# Patient Record
Sex: FEMALE | Race: WHITE | NOT HISPANIC OR LATINO | Employment: UNEMPLOYED | URBAN - METROPOLITAN AREA
[De-identification: names, ages, dates, MRNs, and addresses within clinical notes are randomized per-mention and may not be internally consistent; named-entity substitution may affect disease eponyms.]

---

## 2018-02-20 ENCOUNTER — TELEPHONE (OUTPATIENT)
Dept: FAMILY MEDICINE | Facility: CLINIC | Age: 58
End: 2018-02-20

## 2018-02-20 NOTE — TELEPHONE ENCOUNTER
She states that she gets elevated bp with pounding in head while just barely straining on toliet. And when at rest pt states her HR increases and feels like it is going to burst though her chest and HR is around 114.    pt advised to go to UC/ED. pt verbalized understanding. pt states that they will go to UC now.

## 2018-02-20 NOTE — TELEPHONE ENCOUNTER
----- Message from Stacie Kamran sent at 2/20/2018  3:03 PM CST -----  Please call pt at [phone] states she was a former pt of Dr Cota / next available appt is 03/01 .. Asking to be seen sooner / has elevated blood pressure with pounding in her head / seems worse when she has a bowel movement / please advise

## 2018-03-01 ENCOUNTER — OFFICE VISIT (OUTPATIENT)
Dept: FAMILY MEDICINE | Facility: CLINIC | Age: 58
End: 2018-03-01
Payer: COMMERCIAL

## 2018-03-01 VITALS
HEART RATE: 95 BPM | BODY MASS INDEX: 40.16 KG/M2 | TEMPERATURE: 99 F | DIASTOLIC BLOOD PRESSURE: 90 MMHG | WEIGHT: 204.56 LBS | HEIGHT: 60 IN | OXYGEN SATURATION: 95 % | SYSTOLIC BLOOD PRESSURE: 142 MMHG

## 2018-03-01 DIAGNOSIS — I34.1 MVP (MITRAL VALVE PROLAPSE): ICD-10-CM

## 2018-03-01 DIAGNOSIS — F41.1 GENERALIZED ANXIETY DISORDER: ICD-10-CM

## 2018-03-01 DIAGNOSIS — R74.01 TRANSAMINITIS: ICD-10-CM

## 2018-03-01 DIAGNOSIS — R55 PRE-SYNCOPE: ICD-10-CM

## 2018-03-01 DIAGNOSIS — R00.2 HEART PALPITATIONS: ICD-10-CM

## 2018-03-01 DIAGNOSIS — R94.31 ABNORMAL EKG: ICD-10-CM

## 2018-03-01 DIAGNOSIS — Z09 HOSPITAL DISCHARGE FOLLOW-UP: Primary | ICD-10-CM

## 2018-03-01 DIAGNOSIS — K21.9 GASTROESOPHAGEAL REFLUX DISEASE, ESOPHAGITIS PRESENCE NOT SPECIFIED: ICD-10-CM

## 2018-03-01 DIAGNOSIS — F41.0 PANIC ATTACKS: ICD-10-CM

## 2018-03-01 DIAGNOSIS — F43.9 SITUATIONAL STRESS: ICD-10-CM

## 2018-03-01 DIAGNOSIS — E03.9 HYPOTHYROIDISM, UNSPECIFIED TYPE: ICD-10-CM

## 2018-03-01 PROCEDURE — 99204 OFFICE O/P NEW MOD 45 MIN: CPT | Mod: S$GLB,,, | Performed by: INTERNAL MEDICINE

## 2018-03-01 PROCEDURE — 99999 PR PBB SHADOW E&M-EST. PATIENT-LVL IV: CPT | Mod: PBBFAC,,, | Performed by: INTERNAL MEDICINE

## 2018-03-01 RX ORDER — BUSPIRONE HYDROCHLORIDE 7.5 MG/1
TABLET ORAL
Qty: 60 TABLET | Refills: 1 | Status: SHIPPED | OUTPATIENT
Start: 2018-03-01 | End: 2018-03-20

## 2018-03-01 RX ORDER — ALPRAZOLAM 0.5 MG/1
0.5 TABLET ORAL 2 TIMES DAILY PRN
Qty: 30 TABLET | Refills: 0 | Status: SHIPPED | OUTPATIENT
Start: 2018-03-01 | End: 2018-03-20 | Stop reason: SDUPTHER

## 2018-03-01 RX ORDER — METHOCARBAMOL 750 MG/1
1500 TABLET, FILM COATED ORAL 3 TIMES DAILY
Refills: 0 | COMMUNITY
Start: 2018-02-12

## 2018-03-01 RX ORDER — OXYCODONE HYDROCHLORIDE 10 MG/1
10 TABLET ORAL 3 TIMES DAILY
COMMUNITY
Start: 2018-02-20

## 2018-03-01 RX ORDER — GABAPENTIN 300 MG/1
CAPSULE ORAL
Refills: 5 | COMMUNITY
Start: 2018-02-02

## 2018-03-01 RX ORDER — LEVOTHYROXINE SODIUM 150 MCG
TABLET ORAL
Refills: 3 | COMMUNITY
Start: 2018-02-07 | End: 2020-08-19

## 2018-03-01 RX ORDER — ERGOCALCIFEROL 1.25 MG/1
50000 CAPSULE ORAL
Refills: 3 | COMMUNITY
Start: 2018-01-31 | End: 2020-08-19

## 2018-03-01 NOTE — PROGRESS NOTES
Subjective:       Patient ID: Kimberly Jolley is a 57 y.o. female.    Chief Complaint: ER follow up [facility]; here to also get established w me again as her new PCP; previous pt of mine.    HPI  patient presents today in follow-up from [facility] your visit 2/21/18 when she was seen for palpitations.  History given in ER was that over 7 months she has occasional sporadic increase in blood pressure; day she came to the emergency room was 168/96 where she usually gets 120/80 at home.  Palpitations were noted as being of pounding rapid heartbeat or occasional skipped beats; in the emergency room her pulse rate was 114 then later noted as being 97.  Over the last 2-3 months  when she was trying to have a bowel movement she was noted to have some flushed sensations and lightheaded on occasions; for 3 episodes she told me she noted her heart racing for 30-60 seconds but no syncope; symptoms described or presented as being presyncope by the ER physician's hx; she reported vomiting with one episode but has never passed out.  She has a history of mitral valve prolapse with holter performed in the eighth grade.  She has no chest pain or shortness of breath with these events but does have occasional sweats and mild nausea.  Her palpitations with flutter sensation and thumping in the chest with a pause felt shortly thereafter and occurring about 2 times a day for last 3 months by my history but increasing to 5-6 times a day for the past month.      She was referred to see cardiologist Dr. Clark by the ER physician which she hasn't seen yet.  She does note anxiety which has been increased over last year with a history of chronic anxiety disorder;  She highly suspects that these may be panic attacks; she admits to some depression; and notes that she's been unemployed since 2014 and not sleeping okay.  In the ER patient was reportedly watched on telemetry; her caffeine intake was negative and takes only 2 glasses of wine  about twice a year.  ER records were reviewed at length.  She does admit to occasional sweats and slight nausea with the events. Labs were reviewed as well; chest x-ray was negative; EKG revealed a sinus tach at 117 with nonspecific ST/T-wave changes of note.  Interpretation by cardiologist returned possible anterior/ lateral infarct and upon review the EKG could also include inferior area as well.  The troponin I was negative ×2 in the ER.  Dr. Whitaker manages her chronic neck pain as her pain management physician of note.  Time 4:05 PM to 5:05 PM.  Greater than 50% of time spent in discussion, counseling, and review.  Past medical history and surgical history were reviewed and noted along with social medical history and family medical history.  Review of systems obtained at length prior to physical exam being performed.  [facility] ER records reviewed as well as the labs and workup; labs and US abd ordered for f/u. Cardiology consulted as well.    Review of Systems   Constitutional: Negative for appetite change, fever and unexpected weight change.   HENT: Positive for rhinorrhea. Negative for congestion, postnasal drip and sinus pressure.         Seasonal allergies   Eyes: Negative for discharge and itching.   Respiratory: Negative for cough, chest tightness, shortness of breath and wheezing.    Cardiovascular: Positive for palpitations. Negative for chest pain and leg swelling.   Gastrointestinal: Positive for constipation. Negative for abdominal distention, abdominal pain, blood in stool, diarrhea, nausea and vomiting.        Heartburn w belching   Endocrine: Negative for polydipsia, polyphagia and polyuria.   Genitourinary: Negative for dysuria and hematuria.   Musculoskeletal: Negative for arthralgias and myalgias.   Skin: Negative for rash.   Allergic/Immunologic: Positive for environmental allergies. Negative for food allergies.   Neurological: Positive for tremors. Negative for seizures and syncope.         Hand at times; Dad does same.   Hematological: Negative for adenopathy. Does not bruise/bleed easily.   Psychiatric/Behavioral:        Anxiety chronic and depression; problems sleeping at night.       Objective:      Vitals:    03/01/18 1505   BP: (!) 142/90   BP Location: Left arm   Patient Position: Sitting   BP Method: Medium (Manual)   Pulse: 95   Temp: 98.6 °F (37 °C)   TempSrc: Oral   SpO2: 95%   Weight: 92.8 kg (204 lb 9.4 oz)   Height: 5' (1.524 m)     Body mass index is 39.96 kg/m².    Physical Exam   Constitutional: She is oriented to person, place, and time. She appears well-developed and well-nourished.   HENT:   Head: Normocephalic and atraumatic.   Eyes: EOM are normal.   Neck: Normal range of motion. Neck supple. No thyromegaly present.   Cardiovascular: Normal rate, regular rhythm and normal heart sounds.  Exam reveals no gallop.    No murmur heard.  Pulmonary/Chest: Effort normal and breath sounds normal. No respiratory distress. She has no wheezes. She has no rales.   Abdominal: Soft. Bowel sounds are normal. She exhibits no distension. There is no tenderness. There is no rebound and no guarding.   Musculoskeletal: Normal range of motion. She exhibits no edema.   Lymphadenopathy:     She has no cervical adenopathy.   Neurological: She is alert and oriented to person, place, and time.   Moves all 4 extremities fine.   Skin: No rash noted.   Psychiatric: She has a normal mood and affect. Her behavior is normal. Thought content normal.   Vitals reviewed.      Assessment:       1. Hospital discharge follow-up    2. Heart palpitations    3. Pre-syncope    4. Generalized anxiety disorder    5. Situational stress    6. Panic attacks    7. MVP (mitral valve prolapse)    8. Transaminitis    9. Abnormal EKG    10. Gastroesophageal reflux disease, esophagitis presence not specified    11. Hypothyroidism, unspecified type        Plan:       Heart palpitations; limit caffeine. 24 hr holter needed. To see   [facility]    Pre-syncope; use Valsalva maneuver as needed for heart racing events.    Generalized anxiety disorder; can't tolerate lexapro, zoloft, or paxil; increase aggression noted; hasn't tried prozac. Can't tolerate wellbutrin.      Buspar 7.5 mg po BID after 1 week can increase to TID if needed. Xanax 0.5 mg po BID prn anxiety    Situational stress; stress reduction. Exercise when ok w cardiology.    Panic attacks; Xanax prn basis as above; no caffeine.    MVP (mitral valve prolapse); see Dr Clark cardiology for evaluation. Echo w doppler/holter needed..    Transaminitis; US abd w hepatitis panel needed, repeat LFT's.    Abnormal EKG; cardiology to address; r/o CAD; r/o MI. FMH of CAD.    Gastroesophageal reflux disease, esophagitis presence not specified. No bedtime snacks; weight reduction. Pepcid otc as needed for reflux.    Hypothyroid: needs TFT; same dosing til then.

## 2018-03-01 NOTE — PATIENT INSTRUCTIONS
Heart palpitations; limit caffeine. 24 hr holter needed.    Pre-syncope; use Valsalva maneuver as needed.    Generalized anxiety disorder; can't tolerate lexapro, zoloft, paxil; increase aggression noted; hasn't tried prozac. Can't tolerate wellbutrin.      Buspar 7.5 mg po BID after 1 week can increase to TID if needed. Xanax 0.5 mg po BID prn anxiety    Situational stress; stress reduction.     Panic attacks; Xanax prn basis as above    MVP (mitral valve prolapse); see Dr Clark cardiology for evaluation. Echo w doppler.    Transaminitis; US abd w hepatitis panel needed, repeat LFT's.    Abnormal EKG; cardiology to address; r/o CAD. FMH of CAD.    Gastroesophageal reflux disease, esophagitis presence not specified. No bedtime snacks; weight reduction. Pepcid otc as needed for reflux.    Hypothyroid: needs TFT; same dosing til then.

## 2018-03-06 ENCOUNTER — PATIENT OUTREACH (OUTPATIENT)
Dept: ADMINISTRATIVE | Facility: HOSPITAL | Age: 58
End: 2018-03-06

## 2018-03-06 NOTE — LETTER
March 6, 2018    Kimberly Jolley  [id number] [place] [id number]             [facility]  [address]  [place] [address]  Phone: [phone] Dear Mrs. Jolley:    [facility] is committed to your overall health.  To help you get the most out of each of your visits, we will review your information to make sure you are up to date on all of your recommended tests and/or procedures.      Dr. Benji Seo has found that your chart shows you may be due for your fasting cholesterol labs, hepatitis C screening, mammogram, colon cancer screening, and possibly a flu immunization.     If you have had any of the above done at another facility, please bring the records or information with you so that your record at [facility] will be complete.  If you would like to schedule any of these, please contact me.    If you are currently taking medication, please bring it with you to your appointment for review.    If you have any questions or concerns, please don't hesitate to call.    Thank you for letting us care for you,  Jasmina Lamar LPN Clinical Care Coordinator  [facility]  [phone]

## 2018-03-09 ENCOUNTER — HOSPITAL ENCOUNTER (OUTPATIENT)
Dept: RADIOLOGY | Facility: HOSPITAL | Age: 58
Discharge: HOME | End: 2018-03-09
Attending: INTERNAL MEDICINE
Payer: COMMERCIAL

## 2018-03-09 DIAGNOSIS — R74.01 TRANSAMINITIS: ICD-10-CM

## 2018-03-09 DIAGNOSIS — Z12.11 COLON CANCER SCREENING: ICD-10-CM

## 2018-03-09 PROCEDURE — 76700 US EXAM ABDOM COMPLETE: CPT | Mod: TC,PO

## 2018-03-09 PROCEDURE — 76700 US EXAM ABDOM COMPLETE: CPT | Mod: 26,,, | Performed by: RADIOLOGY

## 2018-03-14 ENCOUNTER — CLINICAL SUPPORT (OUTPATIENT)
Dept: CARDIOLOGY | Facility: CLINIC | Age: 58
End: 2018-03-14
Attending: INTERNAL MEDICINE
Payer: COMMERCIAL

## 2018-03-14 DIAGNOSIS — R55 PRE-SYNCOPE: ICD-10-CM

## 2018-03-14 DIAGNOSIS — R00.2 HEART PALPITATIONS: ICD-10-CM

## 2018-03-14 DIAGNOSIS — I34.1 MVP (MITRAL VALVE PROLAPSE): ICD-10-CM

## 2018-03-14 PROCEDURE — 93306 TTE W/DOPPLER COMPLETE: CPT | Mod: S$GLB,,, | Performed by: INTERNAL MEDICINE

## 2018-03-15 LAB
ESTIMATED PA SYSTOLIC PRESSURE: 17.44
MITRAL VALVE REGURGITATION: NORMAL
RETIRED EF AND QEF - SEE NOTES: 65 (ref 55–65)

## 2018-03-20 ENCOUNTER — OFFICE VISIT (OUTPATIENT)
Dept: FAMILY MEDICINE | Facility: CLINIC | Age: 58
End: 2018-03-20
Payer: COMMERCIAL

## 2018-03-20 VITALS
BODY MASS INDEX: 39.84 KG/M2 | WEIGHT: 202.94 LBS | HEART RATE: 93 BPM | TEMPERATURE: 98 F | OXYGEN SATURATION: 96 % | DIASTOLIC BLOOD PRESSURE: 84 MMHG | SYSTOLIC BLOOD PRESSURE: 136 MMHG | HEIGHT: 60 IN

## 2018-03-20 DIAGNOSIS — E89.0 POSTOPERATIVE HYPOTHYROIDISM: ICD-10-CM

## 2018-03-20 DIAGNOSIS — K21.9 GASTROESOPHAGEAL REFLUX DISEASE, ESOPHAGITIS PRESENCE NOT SPECIFIED: ICD-10-CM

## 2018-03-20 DIAGNOSIS — R13.10 DYSPHAGIA, UNSPECIFIED TYPE: ICD-10-CM

## 2018-03-20 DIAGNOSIS — R94.31 ABNORMAL EKG: ICD-10-CM

## 2018-03-20 DIAGNOSIS — F43.9 SITUATIONAL STRESS: ICD-10-CM

## 2018-03-20 DIAGNOSIS — R74.01 TRANSAMINITIS: ICD-10-CM

## 2018-03-20 DIAGNOSIS — R00.2 HEART PALPITATIONS: ICD-10-CM

## 2018-03-20 DIAGNOSIS — E66.01 CLASS 2 SEVERE OBESITY DUE TO EXCESS CALORIES WITH SERIOUS COMORBIDITY AND BODY MASS INDEX (BMI) OF 39.0 TO 39.9 IN ADULT: ICD-10-CM

## 2018-03-20 DIAGNOSIS — R73.01 IMPAIRED FASTING BLOOD SUGAR: ICD-10-CM

## 2018-03-20 DIAGNOSIS — F41.1 GENERALIZED ANXIETY DISORDER: Primary | ICD-10-CM

## 2018-03-20 DIAGNOSIS — I34.1 MVP (MITRAL VALVE PROLAPSE): ICD-10-CM

## 2018-03-20 DIAGNOSIS — F41.0 PANIC ATTACKS: ICD-10-CM

## 2018-03-20 DIAGNOSIS — K76.0 FATTY LIVER: ICD-10-CM

## 2018-03-20 PROBLEM — R55 PRE-SYNCOPE: Status: ACTIVE | Noted: 2018-03-20

## 2018-03-20 PROBLEM — E03.9 HYPOTHYROIDISM: Status: ACTIVE | Noted: 2018-03-20

## 2018-03-20 PROCEDURE — 99999 PR PBB SHADOW E&M-EST. PATIENT-LVL IV: CPT | Mod: PBBFAC,,, | Performed by: INTERNAL MEDICINE

## 2018-03-20 PROCEDURE — 99215 OFFICE O/P EST HI 40 MIN: CPT | Mod: S$GLB,,, | Performed by: INTERNAL MEDICINE

## 2018-03-20 RX ORDER — BUSPIRONE HYDROCHLORIDE 10 MG/1
TABLET ORAL
Qty: 60 TABLET | Refills: 1 | Status: SHIPPED | OUTPATIENT
Start: 2018-03-20 | End: 2018-06-01 | Stop reason: SDUPTHER

## 2018-03-20 RX ORDER — PANTOPRAZOLE SODIUM 40 MG/1
40 TABLET, DELAYED RELEASE ORAL DAILY
Qty: 30 TABLET | Refills: 2 | Status: SHIPPED | OUTPATIENT
Start: 2018-03-20 | End: 2022-02-15

## 2018-03-20 RX ORDER — ALPRAZOLAM 0.5 MG/1
0.5 TABLET ORAL 2 TIMES DAILY PRN
Qty: 30 TABLET | Refills: 0 | Status: SHIPPED | OUTPATIENT
Start: 2018-03-20 | End: 2018-05-04 | Stop reason: SDUPTHER

## 2018-03-20 RX ORDER — METOPROLOL SUCCINATE 25 MG/1
25 TABLET, EXTENDED RELEASE ORAL DAILY
Qty: 30 TABLET | Refills: 2 | Status: SHIPPED | OUTPATIENT
Start: 2018-03-20 | End: 2018-06-11 | Stop reason: SDUPTHER

## 2018-03-20 NOTE — PROGRESS NOTES
Subjective:       Patient ID: Kimberly Jolley is a 57 y.o. female.    Chief Complaint: Follow-up (US)    HPI Pt here for f/u eval from last office visit; US abd reveals L renal cyst, fatty liver w hepatomegaly; will repeat US in 6 mos. Patient's heart palpitations have improved since she's been on no caffeine.  She has an appointment pending with cardiology 3/28/18 Dr. Clark; could still benefit from a 24-hour Holter.  Pt w generalized anxiety disorder with increased anxiety; hasn't really improved that much.  She is using BuSpar at 7.5 mg 3 times a day with no real benefit so far; she can't tolerate SSRI agents or dopa/norepi reuptake inhibitors; unable to tolerate Zoloft, Paxil, Lexapro, Celexa, Wellbutrin, or Effexor.  Xanax reportedly helps her palpitations.  She has no chest pain with her palpitations except the flutter sensation in her throat.  She does have a history of mitral valve prolapse and panic attacks.  She does reportedly get short fuse easily and worries a lot reportedly; she has been unable to find a job since 2014; marriage reportedly has been stable; her kids are grown; but son has a recent divorce and he is also an alcoholic living in [place] reportedly.  Patient's appetite has been okay but she is also reportedly depressed; not doing things with crying spells at times; with problems getting/staying asleep at times as well.  She does have hypothyroidism and is followed by  an endocrinologist in [place].  2/21/18 TSH was 2.45 and Synthroid was reportedly increased from 137-to 150 µg per day 4 months ago of note.  She has a history of thyroid nodules and has reportedly had a  hemithyroidectomy;  labs were reviewed with patient at length questions were answered.     Total time 948 to 10:45 AM.  Greater than 50% of the time spent on discussion, counseling, and review.  Ultrasound of the abdomen and labs were discussed with patient at length consultations were placed to psychiatry as well  as gastroenterology.  Labs also ordered for follow-up.  Has medical history and surgical medical history were reviewed and noted social medical history and family medical history also were reviewed and noted review of systems obtained at length prior to physical exam been performed.    Review of Systems   Constitutional: Positive for activity change and fatigue. Negative for unexpected weight change.   HENT: Positive for hearing loss and trouble swallowing. Negative for rhinorrhea.         Chronic since 1993. Chronic tinnitus; has seen ENT's prior. Belching and heartburn. No bedtime snacks. Trouble swallowing solids and liquids intermitently; has had esophagus dilated 2-3x's. Last was early 2000; saw Dr Almonte.    Eyes: Positive for visual disturbance. Negative for discharge.   Respiratory: Negative for chest tightness and wheezing.    Cardiovascular: Positive for palpitations. Negative for chest pain.   Gastrointestinal: Positive for constipation. Negative for blood in stool, diarrhea and vomiting.        GERD not on anything; dulcolax laxative as needed.   Endocrine: Negative for polydipsia and polyuria.   Genitourinary: Negative for difficulty urinating, dysuria, hematuria and menstrual problem.   Musculoskeletal: Positive for arthralgias and neck pain. Negative for joint swelling.        Chronic neck pain; has seen dr Acosta anaesthesia for chr pain tx. ; and Dr Aquino as well as her neurologist   Skin: Negative for rash.   Allergic/Immunologic: Negative for environmental allergies and food allergies.   Neurological: Negative for tremors, weakness and headaches.   Hematological: Negative for adenopathy. Does not bruise/bleed easily.   Psychiatric/Behavioral: Positive for dysphoric mood. Negative for confusion.        Anxiety and depression.        Objective:      Vitals:    03/20/18 0932   BP: 136/84   BP Location: Left arm   Patient Position: Sitting   BP Method: Medium (Manual)   Pulse: 93   Temp: 98.1 °F (36.7  °C)   TempSrc: Oral   SpO2: 96%   Weight: 92 kg (202 lb 14.9 oz)   Height: 5' (1.524 m)     Body mass index is 39.63 kg/m².    Physical Exam    Assessment:       1. Generalized anxiety disorder    2. Panic attacks    3. Situational stress    4. Heart palpitations    5. MVP (mitral valve prolapse)    6. Abnormal EKG    7. Postoperative hypothyroidism    8. Transaminitis    9. Fatty liver    10. Class 2 severe obesity due to excess calories with serious comorbidity and body mass index (BMI) of 39.0 to 39.9 in adult    11. Gastroesophageal reflux disease, esophagitis presence not specified    12. Dysphagia, unspecified type    13. History of esophageal dilatation        Plan:       Generalized anxiety disorder; increase buspar to 10 mg po TID prn; psychiatry consult to Dr AMADO Silva.  She also has Xanax 0.5 mg twice a day as needed for anxiety and panic attacks.    Panic attacks; xanxax 0.5 mg BID prn panic attacks renewed.     Situational stress; has buspar to titrate. Exercise w stress reduction    Heart palpitations; has card apt pending Dr Clark, 3/28/18 for w/u. Add metoprolol 25 mg a day; no caffeine.    MVP (mitral valve prolapse); as above; echo needed as well.    Abnormal EKG; needs to r/o CAD; EST as per cardiology; [facility] of CAD, her Dad and his father who  of MI at 55-57.     ASA 81 mg a day.    Postoperative hypothyroidism; endocrine tx's her hypothyroidism; on levothyroxin supplements 150 µg daily.    Transaminitis; limit tylenol and NSAID's. No alcohol to continue; exercise w weight reduction.  Serial following of her liver function tests    Fatty liver; as above; US in 6 mos for reassessment.  Hepatomegaly was also noted; along with a left renal cyst, which will also need an ultrasound in 6 months to document stability of both    Class 2 severe obesity due to excess calories with serious comorbidity and body mass index (BMI) of 39.0 to 39.9 in adult;      Caloric restriction w regular exercise and  weight reduction.  The swallow also help her fatty liver and hepatomegaly.    Gastroesophageal reflux disease, esophagitis presence not specified; see Dr Fabian DAVIS for EGD; add protonix 40 mg daily.        No bedtime snacks. Cut solids well. Extra sips of fluid w eating. Weight reductioon will also help.    Dysphagia, unspecified type; w/u as per GI.  History of esophageal dilatation; 2-3x in past.     Impaired fasting blood sugar; needing hemoglobin A1c and a 2 hour glucose tolerance test; family history positive for diabetes is well

## 2018-03-20 NOTE — PATIENT INSTRUCTIONS
Generalized anxiety disorder; increase buspar to 10 mg po TID prn; psychiatry consult to Dr AMADO Silva.    Panic attacks; xanxax 0.5 mg BID prn panic attacks renewed.    Situational stress; has buspar to titrate. Exercise w stress reduction    Heart palpitations; has card apt pending Dr Clark, 3/28/18 for w/u.    MVP (mitral valve prolapse); as above; echo needed as well.    Abnormal EKG; needs to r/o CAD; EST as per cardiology; [facility] of CAD, her Dad and his father who  of MI at 55-57.    Postoperative hypothyroidism; endocrine tx's her hypothyroidism    Transaminitis; limit tylenol and NSAID's. No alcohol to continue; exercise w weight reduction.    Fatty liver; as above; US in 6 mos for reassessment.     Class 2 severe obesity due to excess calories with serious comorbidity and body mass index (BMI) of 39.0 to 39.9 in adult;      Caloric restriction w regular exercise and weight reduction.    Gastroesophageal reflux disease, esophagitis presence not specified; see Dr Peralta GI for EGD; add protonix 40 mg daily.        No bedtime snacks. Cut solids well. Extra sips of fluid w eating. Weight reductioon will also help.    Dysphagia, unspecified type; w/u as per GI.    History of esophageal dilatation; 2-3x in past.

## 2018-03-28 ENCOUNTER — OFFICE VISIT (OUTPATIENT)
Dept: CARDIOLOGY | Facility: CLINIC | Age: 58
End: 2018-03-28
Payer: COMMERCIAL

## 2018-03-28 VITALS
DIASTOLIC BLOOD PRESSURE: 94 MMHG | BODY MASS INDEX: 40.6 KG/M2 | HEIGHT: 60 IN | SYSTOLIC BLOOD PRESSURE: 157 MMHG | WEIGHT: 206.81 LBS | HEART RATE: 81 BPM

## 2018-03-28 DIAGNOSIS — R00.2 HEART PALPITATIONS: Primary | ICD-10-CM

## 2018-03-28 DIAGNOSIS — R06.02 SHORTNESS OF BREATH: ICD-10-CM

## 2018-03-28 DIAGNOSIS — F41.1 GENERALIZED ANXIETY DISORDER: ICD-10-CM

## 2018-03-28 DIAGNOSIS — I34.1 MVP (MITRAL VALVE PROLAPSE): ICD-10-CM

## 2018-03-28 PROCEDURE — 99214 OFFICE O/P EST MOD 30 MIN: CPT | Mod: S$GLB,,, | Performed by: INTERNAL MEDICINE

## 2018-03-28 PROCEDURE — 99999 PR PBB SHADOW E&M-EST. PATIENT-LVL III: CPT | Mod: PBBFAC,,, | Performed by: INTERNAL MEDICINE

## 2018-03-28 NOTE — PROGRESS NOTES
Subjective:    Patient ID:  Kimberly Jolley is a 57 y.o. female who presents for evaluation of palpitations    HPI  She comes with palpitations on/off for the last months, lasting seconds, no syncope      Review of Systems   Constitution: Negative for decreased appetite, weakness, malaise/fatigue, weight gain and weight loss.   Cardiovascular: Positive for chest pain and irregular heartbeat. Negative for dyspnea on exertion, leg swelling, palpitations and syncope.   Respiratory: Negative for cough and shortness of breath.    Gastrointestinal: Negative.    All other systems reviewed and are negative.       Objective:    Physical Exam   Constitutional: She is oriented to person, place, and time. She appears well-developed and well-nourished.   HENT:   Head: Normocephalic.   Eyes: Pupils are equal, round, and reactive to light.   Neck: Normal range of motion. Neck supple. No JVD present. Carotid bruit is not present. No thyromegaly present.   Cardiovascular: Normal rate, regular rhythm, normal heart sounds, intact distal pulses and normal pulses.  PMI is not displaced.  Exam reveals no gallop.    No murmur heard.  Pulmonary/Chest: Effort normal and breath sounds normal.   Abdominal: Soft. Normal appearance. She exhibits no mass. There is no hepatosplenomegaly. There is no tenderness.   Musculoskeletal: Normal range of motion. She exhibits no edema.   Neurological: She is alert and oriented to person, place, and time. She has normal strength and normal reflexes. No sensory deficit.   Skin: Skin is warm and intact.   Psychiatric: She has a normal mood and affect.   Nursing note and vitals reviewed.    Echo reviewed      Assessment:       1. Heart palpitations    2. MVP (mitral valve prolapse)         Plan:     [facility]  Call with results

## 2018-04-02 ENCOUNTER — TELEPHONE (OUTPATIENT)
Dept: FAMILY MEDICINE | Facility: CLINIC | Age: 58
End: 2018-04-02

## 2018-04-02 PROBLEM — K76.0 FATTY LIVER: Status: ACTIVE | Noted: 2018-04-02

## 2018-04-02 PROBLEM — E66.01 CLASS 2 SEVERE OBESITY DUE TO EXCESS CALORIES WITH SERIOUS COMORBIDITY AND BODY MASS INDEX (BMI) OF 39.0 TO 39.9 IN ADULT: Status: ACTIVE | Noted: 2018-04-02

## 2018-04-02 PROBLEM — R73.01 IMPAIRED FASTING BLOOD SUGAR: Status: ACTIVE | Noted: 2018-04-02

## 2018-04-02 PROBLEM — R13.10 DYSPHAGIA: Status: ACTIVE | Noted: 2018-04-02

## 2018-04-02 PROBLEM — Z98.890 HISTORY OF ESOPHAGEAL DILATATION: Status: ACTIVE | Noted: 2018-04-02

## 2018-04-02 NOTE — TELEPHONE ENCOUNTER
Please notify patient that we've added a hemoglobin A1c and a 2 hour glucose tolerance test to her labs that are needed.  She will need to come by the office to  prescription for these 2 lab items.  She'll need to overnight fast for 8 hours and be prepared to spend 2-1/2 hours in the lab for the 2 hour glucose test.

## 2018-04-02 NOTE — TELEPHONE ENCOUNTER
Spoke with pt and informed her that she can come to the office to  additional lab orders.     Pt said that she would like to call her insurance first due to if her insurance will cover she would like to get labs done at [facility].     Pt said that she will call back to let us know.

## 2018-04-12 ENCOUNTER — TELEPHONE (OUTPATIENT)
Dept: FAMILY MEDICINE | Facility: CLINIC | Age: 58
End: 2018-04-12

## 2018-04-13 NOTE — TELEPHONE ENCOUNTER
Spoke with pt and informed her that labs have been changed to [facility]. Pt schedule lab apt for 04/16/2018 with follow up on 04/24/2018

## 2018-04-13 NOTE — TELEPHONE ENCOUNTER
Please notify patient that her labs were changed to [facility] and they are to be obtained after an overnight 8 hour fast

## 2018-04-18 ENCOUNTER — LAB VISIT (OUTPATIENT)
Dept: LAB | Facility: HOSPITAL | Age: 58
End: 2018-04-18
Attending: INTERNAL MEDICINE
Payer: COMMERCIAL

## 2018-04-18 DIAGNOSIS — F41.0 PANIC ATTACKS: ICD-10-CM

## 2018-04-18 DIAGNOSIS — R73.01 IMPAIRED FASTING BLOOD SUGAR: ICD-10-CM

## 2018-04-18 DIAGNOSIS — I34.1 MVP (MITRAL VALVE PROLAPSE): ICD-10-CM

## 2018-04-18 DIAGNOSIS — K76.0 FATTY LIVER: ICD-10-CM

## 2018-04-18 DIAGNOSIS — F41.1 GENERALIZED ANXIETY DISORDER: ICD-10-CM

## 2018-04-18 DIAGNOSIS — R74.01 TRANSAMINITIS: ICD-10-CM

## 2018-04-18 DIAGNOSIS — E66.01 CLASS 2 SEVERE OBESITY DUE TO EXCESS CALORIES WITH SERIOUS COMORBIDITY AND BODY MASS INDEX (BMI) OF 39.0 TO 39.9 IN ADULT: ICD-10-CM

## 2018-04-18 DIAGNOSIS — R00.2 HEART PALPITATIONS: ICD-10-CM

## 2018-04-18 LAB
ALBUMIN SERPL BCP-MCNC: 3.9 G/DL
ALP SERPL-CCNC: 75 U/L
ALT SERPL W/O P-5'-P-CCNC: 76 U/L
ANION GAP SERPL CALC-SCNC: 11 MMOL/L
AST SERPL-CCNC: 49 U/L
BILIRUB SERPL-MCNC: 0.8 MG/DL
BUN SERPL-MCNC: 21 MG/DL
CALCIUM SERPL-MCNC: 10 MG/DL
CHLORIDE SERPL-SCNC: 106 MMOL/L
CO2 SERPL-SCNC: 27 MMOL/L
CREAT SERPL-MCNC: 0.8 MG/DL
EST. GFR  (AFRICAN AMERICAN): >60 ML/MIN/1.73 M^2
EST. GFR  (NON AFRICAN AMERICAN): >60 ML/MIN/1.73 M^2
ESTIMATED AVG GLUCOSE: 114 MG/DL
GLUCOSE SERPL-MCNC: 105 MG/DL
GLUCOSE SERPL-MCNC: 107 MG/DL
GLUCOSE SERPL-MCNC: 111 MG/DL
GLUCOSE SERPL-MCNC: 177 MG/DL
HBA1C MFR BLD HPLC: 5.6 %
HBV CORE IGM SERPL QL IA: NEGATIVE
HBV SURFACE AG SERPL QL IA: NEGATIVE
HCV AB SERPL QL IA: NEGATIVE
POTASSIUM SERPL-SCNC: 4.1 MMOL/L
PROT SERPL-MCNC: 7.4 G/DL
SODIUM SERPL-SCNC: 144 MMOL/L
T4 FREE SERPL-MCNC: 1.12 NG/DL
TSH SERPL DL<=0.005 MIU/L-ACNC: 3.28 UIU/ML

## 2018-04-18 PROCEDURE — 84439 ASSAY OF FREE THYROXINE: CPT

## 2018-04-18 PROCEDURE — 86803 HEPATITIS C AB TEST: CPT

## 2018-04-18 PROCEDURE — 80053 COMPREHEN METABOLIC PANEL: CPT

## 2018-04-18 PROCEDURE — 83036 HEMOGLOBIN GLYCOSYLATED A1C: CPT

## 2018-04-18 PROCEDURE — 84443 ASSAY THYROID STIM HORMONE: CPT

## 2018-04-18 PROCEDURE — 36415 COLL VENOUS BLD VENIPUNCTURE: CPT | Mod: PO

## 2018-04-18 PROCEDURE — 86705 HEP B CORE ANTIBODY IGM: CPT

## 2018-04-18 PROCEDURE — 82951 GLUCOSE TOLERANCE TEST (GTT): CPT

## 2018-04-18 PROCEDURE — 87340 HEPATITIS B SURFACE AG IA: CPT

## 2018-04-24 ENCOUNTER — OFFICE VISIT (OUTPATIENT)
Dept: FAMILY MEDICINE | Facility: CLINIC | Age: 58
End: 2018-04-24
Payer: COMMERCIAL

## 2018-04-24 VITALS
SYSTOLIC BLOOD PRESSURE: 138 MMHG | DIASTOLIC BLOOD PRESSURE: 86 MMHG | BODY MASS INDEX: 41.23 KG/M2 | OXYGEN SATURATION: 98 % | HEART RATE: 100 BPM | HEIGHT: 60 IN | WEIGHT: 210 LBS | TEMPERATURE: 98 F

## 2018-04-24 DIAGNOSIS — R73.01 IMPAIRED FASTING GLUCOSE: ICD-10-CM

## 2018-04-24 DIAGNOSIS — R07.9 CHEST PAIN, UNSPECIFIED TYPE: ICD-10-CM

## 2018-04-24 DIAGNOSIS — R73.01 IMPAIRED FASTING BLOOD SUGAR: ICD-10-CM

## 2018-04-24 DIAGNOSIS — F41.0 GENERALIZED ANXIETY DISORDER WITH PANIC ATTACKS: ICD-10-CM

## 2018-04-24 DIAGNOSIS — K76.0 FATTY LIVER: ICD-10-CM

## 2018-04-24 DIAGNOSIS — K21.9 GASTROESOPHAGEAL REFLUX DISEASE, ESOPHAGITIS PRESENCE NOT SPECIFIED: ICD-10-CM

## 2018-04-24 DIAGNOSIS — F41.1 GENERALIZED ANXIETY DISORDER WITH PANIC ATTACKS: ICD-10-CM

## 2018-04-24 DIAGNOSIS — Z00.00 HEALTHCARE MAINTENANCE: ICD-10-CM

## 2018-04-24 DIAGNOSIS — R74.01 TRANSAMINITIS: ICD-10-CM

## 2018-04-24 DIAGNOSIS — E03.9 HYPOTHYROIDISM, UNSPECIFIED TYPE: Primary | ICD-10-CM

## 2018-04-24 PROCEDURE — 99999 PR PBB SHADOW E&M-EST. PATIENT-LVL III: CPT | Mod: PBBFAC,,, | Performed by: INTERNAL MEDICINE

## 2018-04-24 PROCEDURE — 99214 OFFICE O/P EST MOD 30 MIN: CPT | Mod: S$GLB,,, | Performed by: INTERNAL MEDICINE

## 2018-04-24 RX ORDER — IBUPROFEN 200 MG
200 TABLET ORAL SEE ADMIN INSTRUCTIONS
COMMUNITY

## 2018-04-24 RX ORDER — LEVOTHYROXINE SODIUM 25 UG/1
TABLET ORAL
Qty: 15 TABLET | Refills: 2 | Status: SHIPPED | OUTPATIENT
Start: 2018-04-24 | End: 2018-05-14 | Stop reason: SDUPTHER

## 2018-04-24 NOTE — PATIENT INSTRUCTIONS
Hypothyroidism, unspecified type; increase synthroid by 12.5 mcg a day.  -     levothyroxine (SYNTHROID) 25 MCG tablet; 1/2 of 25 mcg po q am. Please give as SYNTHROID and not generic.  Dispense: 15 tablet; Refill: 2    Impaired fasting blood sugar. Exercise recommended with weight reduction and low carb diet; we'll follow hemoglobin A1c's with you periodically.    Transaminitis; keep well hydrated; no alcohol or tylenol. Milk thistle supplements. Exercise w weight reduction.     Fatty liver w HMG.    Gastroesophageal reflux disease, esophagitis presence not specified. No bedtime snacks; weight reduction. Pantoprazole.    Class 3 obesity with serious comorbidity and body mass index (BMI) of 40.0 to 44.9 in adult, unspecified obesity type. Caloric     restriction w regular exercise and weight reduction.    Generalized anxiety disorder with panic attacks; exercise w stress reduction; limit her caffeine. Stress redcution; exercise; Has buspar or xanxax if needed.    Chest pain, unspecified type; Dr Clark working up; EST tomorrow;

## 2018-04-24 NOTE — PROGRESS NOTES
Subjective:       Patient ID: Kimberly Jolley is a 57 y.o. female.    Chief Complaint: Follow-up (labs)    HPI  Overall she's been doing fine.   Anxiety w hx of panic ds.: can't tolerate a lot of meds for anxiety; Xanax or buspar not much help.  Transaminitis/fatty liver; no alcohol or tylenol. Tries to stay well hydrated. Exercises needed; will help.   GERD; no bedtime snacks; pantoprazole daily.  Hypothyroidism: takes her thyroid medicine appropriately.   Obesity; exercise regimen needs to be started and adhered to Goal 5x a week for 30 min each day. Smaller portions.  Total time: 1628-1533. >50% time spent on counseling, discussion, and review.    Review of Systems   Constitutional: Negative for activity change and unexpected weight change.   HENT: Positive for rhinorrhea. Negative for hearing loss and trouble swallowing.         Seasonal allergies   Eyes: Negative for discharge and visual disturbance.   Respiratory: Positive for chest tightness. Negative for wheezing.         Occ chest pain w EST tomorrow by Dr Clark   Cardiovascular: Positive for palpitations. Negative for chest pain.        Cardiology to address. Limit her caffeine.   Gastrointestinal: Negative for blood in stool, constipation, diarrhea and vomiting.   Endocrine: Negative for polydipsia and polyuria.   Genitourinary: Negative for difficulty urinating, dysuria, hematuria and menstrual problem.   Musculoskeletal: Positive for neck pain. Negative for arthralgias and joint swelling.        Dr Acosta managing.   Skin: Negative for rash.   Allergic/Immunologic: Positive for environmental allergies. Negative for food allergies.   Neurological: Negative for syncope, weakness and headaches.   Hematological: Negative for adenopathy. Does not bruise/bleed easily.   Psychiatric/Behavioral: Positive for dysphoric mood. Negative for confusion.       Objective:      Vitals:    04/24/18 1308   BP: 138/86   BP Location: Left arm   Patient Position: Sitting    BP Method: Medium (Manual)   Pulse: 100   Temp: 98.2 °F (36.8 °C)   TempSrc: Oral   SpO2: 98%   Weight: 95.2 kg (209 lb 15.8 oz)   Height: 5' (1.524 m)     Body mass index is 41.01 kg/m².    Physical Exam   Constitutional: She is oriented to person, place, and time. She appears well-developed and well-nourished.   HENT:   Head: Normocephalic and atraumatic.   Eyes: EOM are normal.   Neck: Normal range of motion. Neck supple. No thyromegaly present.   Supple w base of cervical vert tender to palp; seeing Dr Acosta for this.   Cardiovascular: Normal rate, regular rhythm and normal heart sounds.  Exam reveals no gallop.    No murmur heard.  Pulmonary/Chest: Effort normal and breath sounds normal. No respiratory distress. She has no wheezes. She has no rales.   Abdominal: Soft. Bowel sounds are normal. She exhibits no distension. There is no tenderness. There is no rebound and no guarding.   Musculoskeletal: Normal range of motion. She exhibits no edema.   Lymphadenopathy:     She has no cervical adenopathy.   Neurological: She is alert and oriented to person, place, and time.   Moves all 4 extremities fine.   Skin: No rash noted.   Psychiatric: She has a normal mood and affect. Her behavior is normal. Thought content normal.   Vitals reviewed.      Assessment:       1. Hypothyroidism, unspecified type    2. Impaired fasting blood sugar    3. Transaminitis    4. Fatty liver    5. Gastroesophageal reflux disease, esophagitis presence not specified    6. Class 3 obesity with serious comorbidity and body mass index (BMI) of 40.0 to 44.9 in adult, unspecified obesity type    7. Generalized anxiety disorder with panic attacks    8. Chest pain, unspecified type    9. Healthcare maintenance    10. Impaired fasting glucose        Plan:       Hypothyroidism, unspecified type; increase synthroid by 12.5 mcg a day.  -     levothyroxine (SYNTHROID) 25 MCG tablet; 1/2 of 25 mcg po q am. Please give as SYNTHROID and not generic.   Dispense: 15 tablet; Refill: 2    Impaired fasting blood sugar. Exercise recommended with weight reduction and low carb diet; we'll follow hemoglobin A1c's with you periodically.    Transaminitis; keep well hydrated; no alcohol or tylenol. Milk thistle supplements. Exercise w weight reduction.     Fatty liver w HMG.    Gastroesophageal reflux disease, esophagitis presence not specified. No bedtime snacks; weight reduction. Pantoprazole.    Class 3 obesity with serious comorbidity and body mass index (BMI) of 40.0 to 44.9 in adult, unspecified obesity type. Caloric     restriction w regular exercise and weight reduction.    Generalized anxiety disorder with panic attacks; exercise w stress reduction; limit her caffeine. Stress redcution; exercise; Has buspar or xanxax if needed.    Chest pain, unspecified type; Dr Clark working up; EST tomorrow;     Healthcare maintenance; mammogram ordered. DEXA was last yr. w osteopenia improving. 2yr f/u.  Weight bearing exercises. Calcium w vit D needed.

## 2018-04-25 ENCOUNTER — HOSPITAL ENCOUNTER (OUTPATIENT)
Dept: RADIOLOGY | Facility: HOSPITAL | Age: 58
Discharge: HOME | End: 2018-04-25
Attending: INTERNAL MEDICINE
Payer: COMMERCIAL

## 2018-04-25 ENCOUNTER — CLINICAL SUPPORT (OUTPATIENT)
Dept: CARDIOLOGY | Facility: CLINIC | Age: 58
End: 2018-04-25
Attending: INTERNAL MEDICINE
Payer: COMMERCIAL

## 2018-04-25 DIAGNOSIS — F41.1 GENERALIZED ANXIETY DISORDER: ICD-10-CM

## 2018-04-25 DIAGNOSIS — R55 PRE-SYNCOPE: ICD-10-CM

## 2018-04-25 DIAGNOSIS — R06.02 SHORTNESS OF BREATH: ICD-10-CM

## 2018-04-25 DIAGNOSIS — R00.2 HEART PALPITATIONS: ICD-10-CM

## 2018-04-25 DIAGNOSIS — I34.1 MVP (MITRAL VALVE PROLAPSE): ICD-10-CM

## 2018-04-25 PROCEDURE — 93224 XTRNL ECG REC UP TO 48 HRS: CPT | Mod: S$GLB,,, | Performed by: INTERNAL MEDICINE

## 2018-04-25 PROCEDURE — 93015 CV STRESS TEST SUPVJ I&R: CPT | Mod: S$GLB,,, | Performed by: INTERNAL MEDICINE

## 2018-04-25 PROCEDURE — 78452 HT MUSCLE IMAGE SPECT MULT: CPT | Mod: 26,,, | Performed by: INTERNAL MEDICINE

## 2018-04-26 LAB — DIASTOLIC DYSFUNCTION: NO

## 2018-04-30 ENCOUNTER — HOSPITAL ENCOUNTER (OUTPATIENT)
Dept: RADIOLOGY | Facility: HOSPITAL | Age: 58
Discharge: HOME | End: 2018-04-30
Attending: INTERNAL MEDICINE
Payer: COMMERCIAL

## 2018-04-30 DIAGNOSIS — Z12.31 VISIT FOR SCREENING MAMMOGRAM: ICD-10-CM

## 2018-04-30 DIAGNOSIS — Z00.00 HEALTHCARE MAINTENANCE: ICD-10-CM

## 2018-04-30 PROCEDURE — 77067 SCR MAMMO BI INCL CAD: CPT | Mod: TC,PO

## 2018-04-30 PROCEDURE — 77067 SCR MAMMO BI INCL CAD: CPT | Mod: 26,,, | Performed by: RADIOLOGY

## 2018-04-30 PROCEDURE — 77063 BREAST TOMOSYNTHESIS BI: CPT | Mod: 26,,, | Performed by: RADIOLOGY

## 2018-05-01 ENCOUNTER — TELEPHONE (OUTPATIENT)
Dept: FAMILY MEDICINE | Facility: CLINIC | Age: 58
End: 2018-05-01

## 2018-05-04 DIAGNOSIS — F41.1 GENERALIZED ANXIETY DISORDER: ICD-10-CM

## 2018-05-04 DIAGNOSIS — F41.0 PANIC ATTACKS: ICD-10-CM

## 2018-05-04 DIAGNOSIS — R00.2 HEART PALPITATIONS: ICD-10-CM

## 2018-05-05 RX ORDER — ALPRAZOLAM 0.5 MG/1
0.5 TABLET ORAL 2 TIMES DAILY PRN
Qty: 30 TABLET | Refills: 0 | Status: SHIPPED | OUTPATIENT
Start: 2018-05-05 | End: 2020-08-19

## 2018-05-14 ENCOUNTER — PATIENT MESSAGE (OUTPATIENT)
Dept: FAMILY MEDICINE | Facility: CLINIC | Age: 58
End: 2018-05-14

## 2018-05-14 DIAGNOSIS — E03.9 HYPOTHYROIDISM, UNSPECIFIED TYPE: ICD-10-CM

## 2018-05-15 ENCOUNTER — TELEPHONE (OUTPATIENT)
Dept: FAMILY MEDICINE | Facility: CLINIC | Age: 58
End: 2018-05-15

## 2018-05-15 DIAGNOSIS — E03.9 HYPOTHYROIDISM, UNSPECIFIED TYPE: Primary | ICD-10-CM

## 2018-05-15 RX ORDER — LEVOTHYROXINE SODIUM 25 UG/1
TABLET ORAL
Qty: 15 TABLET | Refills: 1 | Status: SHIPPED | OUTPATIENT
Start: 2018-05-15 | End: 2018-11-15

## 2018-05-15 RX ORDER — LEVOTHYROXINE SODIUM 150 UG/1
150 TABLET ORAL DAILY
Qty: 90 TABLET | Refills: 1 | Status: SHIPPED | OUTPATIENT
Start: 2018-05-15 | End: 2018-11-12 | Stop reason: SDUPTHER

## 2018-05-15 NOTE — TELEPHONE ENCOUNTER
Spoke with pt and informed her that refill has been sent in to pharmacy.     Pt verbally understands.

## 2018-05-15 NOTE — TELEPHONE ENCOUNTER
Please notify patient that a prescription for levothyroxine/Synthroid 150 µg by mouth daily was sent in to [facility] in [address]; 90 days with one refill

## 2018-06-01 DIAGNOSIS — F43.9 SITUATIONAL STRESS: ICD-10-CM

## 2018-06-01 DIAGNOSIS — R00.2 HEART PALPITATIONS: ICD-10-CM

## 2018-06-01 DIAGNOSIS — F41.1 GENERALIZED ANXIETY DISORDER: ICD-10-CM

## 2018-06-01 DIAGNOSIS — F41.0 PANIC ATTACKS: ICD-10-CM

## 2018-06-01 RX ORDER — BUSPIRONE HYDROCHLORIDE 10 MG/1
TABLET ORAL
Qty: 60 TABLET | Refills: 1 | Status: SHIPPED | OUTPATIENT
Start: 2018-06-01 | End: 2018-08-24 | Stop reason: SDUPTHER

## 2018-06-11 RX ORDER — METOPROLOL SUCCINATE 25 MG/1
TABLET, EXTENDED RELEASE ORAL
Qty: 30 TABLET | Refills: 2 | Status: SHIPPED | OUTPATIENT
Start: 2018-06-11 | End: 2018-09-13 | Stop reason: SDUPTHER

## 2018-08-24 DIAGNOSIS — F41.0 PANIC ATTACKS: ICD-10-CM

## 2018-08-24 DIAGNOSIS — R00.2 HEART PALPITATIONS: ICD-10-CM

## 2018-08-24 DIAGNOSIS — F43.9 SITUATIONAL STRESS: ICD-10-CM

## 2018-08-24 DIAGNOSIS — F41.1 GENERALIZED ANXIETY DISORDER: ICD-10-CM

## 2018-08-24 RX ORDER — BUSPIRONE HYDROCHLORIDE 10 MG/1
TABLET ORAL
Qty: 60 TABLET | Refills: 1 | Status: SHIPPED | OUTPATIENT
Start: 2018-08-24 | End: 2018-10-03 | Stop reason: SDUPTHER

## 2018-09-11 DIAGNOSIS — E03.9 HYPOTHYROIDISM, UNSPECIFIED TYPE: ICD-10-CM

## 2018-09-12 RX ORDER — LEVOTHYROXINE SODIUM 25 UG/1
TABLET ORAL
Qty: 15 TABLET | Refills: 2 | Status: SHIPPED | OUTPATIENT
Start: 2018-09-12 | End: 2018-11-15

## 2018-09-15 RX ORDER — METOPROLOL SUCCINATE 25 MG/1
TABLET, EXTENDED RELEASE ORAL
Qty: 30 TABLET | Refills: 3 | Status: SHIPPED | OUTPATIENT
Start: 2018-09-15 | End: 2019-01-10 | Stop reason: SDUPTHER

## 2018-10-03 DIAGNOSIS — R00.2 HEART PALPITATIONS: ICD-10-CM

## 2018-10-03 DIAGNOSIS — F41.1 GENERALIZED ANXIETY DISORDER: ICD-10-CM

## 2018-10-03 DIAGNOSIS — F41.0 PANIC ATTACKS: ICD-10-CM

## 2018-10-03 DIAGNOSIS — F43.9 SITUATIONAL STRESS: ICD-10-CM

## 2018-10-04 RX ORDER — BUSPIRONE HYDROCHLORIDE 10 MG/1
TABLET ORAL
Qty: 60 TABLET | Refills: 0 | Status: SHIPPED | OUTPATIENT
Start: 2018-10-04 | End: 2018-10-26 | Stop reason: SDUPTHER

## 2018-10-04 NOTE — TELEPHONE ENCOUNTER
Approved one time only. Needs appt.  The patient has been past due since July please schedule a follow-up appointment at obtain her labs prior by few days

## 2018-10-26 DIAGNOSIS — F43.9 SITUATIONAL STRESS: ICD-10-CM

## 2018-10-26 DIAGNOSIS — F41.1 GENERALIZED ANXIETY DISORDER: ICD-10-CM

## 2018-10-26 DIAGNOSIS — R00.2 HEART PALPITATIONS: ICD-10-CM

## 2018-10-26 DIAGNOSIS — F41.0 PANIC ATTACKS: ICD-10-CM

## 2018-10-27 RX ORDER — BUSPIRONE HYDROCHLORIDE 10 MG/1
TABLET ORAL
Qty: 60 TABLET | Refills: 0 | Status: SHIPPED | OUTPATIENT
Start: 2018-10-27 | End: 2018-12-29 | Stop reason: SDUPTHER

## 2018-11-12 DIAGNOSIS — E03.9 HYPOTHYROIDISM, UNSPECIFIED TYPE: ICD-10-CM

## 2018-11-13 RX ORDER — LEVOTHYROXINE SODIUM 150 UG/1
TABLET ORAL
Qty: 90 TABLET | Refills: 2 | Status: SHIPPED | OUTPATIENT
Start: 2018-11-13 | End: 2019-08-02 | Stop reason: SDUPTHER

## 2018-11-14 ENCOUNTER — PATIENT MESSAGE (OUTPATIENT)
Dept: FAMILY MEDICINE | Facility: CLINIC | Age: 58
End: 2018-11-14

## 2018-11-14 DIAGNOSIS — E03.9 HYPOTHYROIDISM, UNSPECIFIED TYPE: Primary | ICD-10-CM

## 2018-11-15 RX ORDER — LEVOTHYROXINE SODIUM 25 UG/1
25 TABLET ORAL DAILY
Qty: 15 TABLET | Refills: 1 | Status: SHIPPED | OUTPATIENT
Start: 2018-11-15 | End: 2019-01-26 | Stop reason: SDUPTHER

## 2018-11-16 NOTE — TELEPHONE ENCOUNTER
Please advise patient that levothyroxine 25 mcg half a tablet p.o. daily was sent into the pharmacist for her and specifically stated to give generic and not Synthroid.      Patient needs to schedule follow-up appointment with labs prior as she is past due

## 2018-12-29 DIAGNOSIS — F41.0 PANIC ATTACKS: ICD-10-CM

## 2018-12-29 DIAGNOSIS — R00.2 HEART PALPITATIONS: ICD-10-CM

## 2018-12-29 DIAGNOSIS — F43.9 SITUATIONAL STRESS: ICD-10-CM

## 2018-12-29 DIAGNOSIS — F41.1 GENERALIZED ANXIETY DISORDER: ICD-10-CM

## 2018-12-30 RX ORDER — BUSPIRONE HYDROCHLORIDE 10 MG/1
TABLET ORAL
Qty: 60 TABLET | Refills: 0 | Status: SHIPPED | OUTPATIENT
Start: 2018-12-30 | End: 2020-08-19

## 2019-01-10 RX ORDER — METOPROLOL SUCCINATE 25 MG/1
TABLET, EXTENDED RELEASE ORAL
Qty: 30 TABLET | Refills: 1 | Status: SHIPPED | OUTPATIENT
Start: 2019-01-10 | End: 2019-03-10 | Stop reason: SDUPTHER

## 2019-01-26 DIAGNOSIS — E03.9 HYPOTHYROIDISM, UNSPECIFIED TYPE: ICD-10-CM

## 2019-01-29 RX ORDER — LEVOTHYROXINE SODIUM 25 UG/1
TABLET ORAL
Qty: 15 TABLET | Refills: 1 | Status: SHIPPED | OUTPATIENT
Start: 2019-01-29 | End: 2019-04-12 | Stop reason: SDUPTHER

## 2019-03-10 RX ORDER — METOPROLOL SUCCINATE 25 MG/1
TABLET, EXTENDED RELEASE ORAL
Qty: 90 TABLET | Refills: 1 | Status: SHIPPED | OUTPATIENT
Start: 2019-03-10 | End: 2019-08-28 | Stop reason: SDUPTHER

## 2019-03-11 DIAGNOSIS — Z12.11 COLON CANCER SCREENING: ICD-10-CM

## 2019-04-12 DIAGNOSIS — E03.9 HYPOTHYROIDISM, UNSPECIFIED TYPE: ICD-10-CM

## 2019-04-14 RX ORDER — LEVOTHYROXINE SODIUM 25 UG/1
TABLET ORAL
Qty: 15 TABLET | Refills: 0 | Status: SHIPPED | OUTPATIENT
Start: 2019-04-14 | End: 2019-05-11 | Stop reason: SDUPTHER

## 2019-04-15 NOTE — TELEPHONE ENCOUNTER
Left message on voicemail that rx was sent to pharmacy and she needs to schedule an appt either by phone or online.

## 2019-05-06 ENCOUNTER — LAB VISIT (OUTPATIENT)
Dept: LAB | Facility: HOSPITAL | Age: 59
End: 2019-05-06
Attending: INTERNAL MEDICINE
Payer: COMMERCIAL

## 2019-05-06 DIAGNOSIS — Z12.11 COLON CANCER SCREENING: ICD-10-CM

## 2019-05-06 LAB — HEMOCCULT STL QL IA: NEGATIVE

## 2019-05-06 PROCEDURE — 82274 ASSAY TEST FOR BLOOD FECAL: CPT

## 2019-05-11 DIAGNOSIS — E03.9 HYPOTHYROIDISM, UNSPECIFIED TYPE: ICD-10-CM

## 2019-05-11 RX ORDER — LEVOTHYROXINE SODIUM 25 UG/1
TABLET ORAL
Qty: 15 TABLET | Refills: 3 | Status: SHIPPED | OUTPATIENT
Start: 2019-05-11 | End: 2019-08-28 | Stop reason: SDUPTHER

## 2019-08-02 DIAGNOSIS — E03.9 HYPOTHYROIDISM, UNSPECIFIED TYPE: ICD-10-CM

## 2019-08-02 RX ORDER — LEVOTHYROXINE SODIUM 150 UG/1
TABLET ORAL
Qty: 90 TABLET | Refills: 1 | Status: SHIPPED | OUTPATIENT
Start: 2019-08-02 | End: 2020-08-24

## 2019-08-07 RX ORDER — LEVOTHYROXINE SODIUM 150 UG/1
TABLET ORAL
Qty: 90 TABLET | Refills: 1 | Status: SHIPPED | OUTPATIENT
Start: 2019-08-07 | End: 2020-08-19

## 2019-08-28 DIAGNOSIS — E03.9 HYPOTHYROIDISM, UNSPECIFIED TYPE: ICD-10-CM

## 2019-08-29 RX ORDER — LEVOTHYROXINE SODIUM 25 UG/1
TABLET ORAL
Qty: 15 TABLET | Refills: 3 | Status: SHIPPED | OUTPATIENT
Start: 2019-08-29 | End: 2019-12-27

## 2019-08-29 RX ORDER — METOPROLOL SUCCINATE 25 MG/1
TABLET, EXTENDED RELEASE ORAL
Qty: 90 TABLET | Refills: 3 | Status: SHIPPED | OUTPATIENT
Start: 2019-08-29 | End: 2020-08-19 | Stop reason: SDUPTHER

## 2019-12-26 DIAGNOSIS — E03.9 HYPOTHYROIDISM, UNSPECIFIED TYPE: ICD-10-CM

## 2019-12-27 RX ORDER — LEVOTHYROXINE SODIUM 25 UG/1
TABLET ORAL
Qty: 15 TABLET | Refills: 3 | Status: SHIPPED | OUTPATIENT
Start: 2019-12-27 | End: 2020-04-16

## 2020-04-16 DIAGNOSIS — E03.9 HYPOTHYROIDISM, UNSPECIFIED TYPE: ICD-10-CM

## 2020-04-16 RX ORDER — LEVOTHYROXINE SODIUM 25 UG/1
TABLET ORAL
Qty: 15 TABLET | Refills: 3 | Status: SHIPPED | OUTPATIENT
Start: 2020-04-16 | End: 2020-08-11

## 2020-08-18 ENCOUNTER — TELEPHONE (OUTPATIENT)
Dept: FAMILY MEDICINE | Facility: CLINIC | Age: 60
End: 2020-08-18

## 2020-08-19 ENCOUNTER — OFFICE VISIT (OUTPATIENT)
Dept: FAMILY MEDICINE | Facility: CLINIC | Age: 60
End: 2020-08-19
Payer: COMMERCIAL

## 2020-08-19 ENCOUNTER — LAB VISIT (OUTPATIENT)
Dept: LAB | Facility: HOSPITAL | Age: 60
End: 2020-08-19
Attending: INTERNAL MEDICINE
Payer: COMMERCIAL

## 2020-08-19 VITALS
WEIGHT: 200.94 LBS | TEMPERATURE: 98 F | SYSTOLIC BLOOD PRESSURE: 178 MMHG | BODY MASS INDEX: 39.45 KG/M2 | DIASTOLIC BLOOD PRESSURE: 98 MMHG | HEART RATE: 80 BPM | HEIGHT: 60 IN

## 2020-08-19 DIAGNOSIS — E66.01 CLASS 2 SEVERE OBESITY DUE TO EXCESS CALORIES WITH SERIOUS COMORBIDITY AND BODY MASS INDEX (BMI) OF 39.0 TO 39.9 IN ADULT: ICD-10-CM

## 2020-08-19 DIAGNOSIS — F41.1 GENERALIZED ANXIETY DISORDER: ICD-10-CM

## 2020-08-19 DIAGNOSIS — E89.0 POSTOPERATIVE HYPOTHYROIDISM: ICD-10-CM

## 2020-08-19 DIAGNOSIS — R73.01 IMPAIRED FASTING BLOOD SUGAR: ICD-10-CM

## 2020-08-19 DIAGNOSIS — G89.4 CHRONIC PAIN SYNDROME: ICD-10-CM

## 2020-08-19 DIAGNOSIS — I10 UNCONTROLLED HYPERTENSION: Primary | ICD-10-CM

## 2020-08-19 DIAGNOSIS — R74.01 TRANSAMINITIS: ICD-10-CM

## 2020-08-19 DIAGNOSIS — K21.9 GASTROESOPHAGEAL REFLUX DISEASE, ESOPHAGITIS PRESENCE NOT SPECIFIED: ICD-10-CM

## 2020-08-19 DIAGNOSIS — I34.1 MVP (MITRAL VALVE PROLAPSE): ICD-10-CM

## 2020-08-19 DIAGNOSIS — F41.0 PANIC ATTACKS: ICD-10-CM

## 2020-08-19 LAB
ALBUMIN SERPL BCP-MCNC: 4 G/DL (ref 3.5–5.2)
ALP SERPL-CCNC: 79 U/L (ref 55–135)
ALT SERPL W/O P-5'-P-CCNC: 33 U/L (ref 10–44)
ANION GAP SERPL CALC-SCNC: 12 MMOL/L (ref 8–16)
AST SERPL-CCNC: 26 U/L (ref 10–40)
BASOPHILS # BLD AUTO: 0.04 K/UL (ref 0–0.2)
BASOPHILS NFR BLD: 0.7 % (ref 0–1.9)
BILIRUB SERPL-MCNC: 0.4 MG/DL (ref 0.1–1)
BUN SERPL-MCNC: 15 MG/DL (ref 6–20)
CALCIUM SERPL-MCNC: 9.7 MG/DL (ref 8.7–10.5)
CHLORIDE SERPL-SCNC: 109 MMOL/L (ref 95–110)
CHOLEST SERPL-MCNC: 192 MG/DL (ref 120–199)
CHOLEST/HDLC SERPL: 3.2 {RATIO} (ref 2–5)
CO2 SERPL-SCNC: 24 MMOL/L (ref 23–29)
CREAT SERPL-MCNC: 0.8 MG/DL (ref 0.5–1.4)
DIFFERENTIAL METHOD: ABNORMAL
EOSINOPHIL # BLD AUTO: 0.1 K/UL (ref 0–0.5)
EOSINOPHIL NFR BLD: 1.1 % (ref 0–8)
ERYTHROCYTE [DISTWIDTH] IN BLOOD BY AUTOMATED COUNT: 13.4 % (ref 11.5–14.5)
EST. GFR  (AFRICAN AMERICAN): >60 ML/MIN/1.73 M^2
EST. GFR  (NON AFRICAN AMERICAN): >60 ML/MIN/1.73 M^2
GLUCOSE SERPL-MCNC: 100 MG/DL (ref 70–110)
HCT VFR BLD AUTO: 38.1 % (ref 37–48.5)
HDLC SERPL-MCNC: 60 MG/DL (ref 40–75)
HDLC SERPL: 31.3 % (ref 20–50)
HGB BLD-MCNC: 12.1 G/DL (ref 12–16)
IMM GRANULOCYTES # BLD AUTO: 0.02 K/UL (ref 0–0.04)
IMM GRANULOCYTES NFR BLD AUTO: 0.4 % (ref 0–0.5)
LDLC SERPL CALC-MCNC: 96.8 MG/DL (ref 63–159)
LYMPHOCYTES # BLD AUTO: 1.9 K/UL (ref 1–4.8)
LYMPHOCYTES NFR BLD: 35.1 % (ref 18–48)
MCH RBC QN AUTO: 30.9 PG (ref 27–31)
MCHC RBC AUTO-ENTMCNC: 31.8 G/DL (ref 32–36)
MCV RBC AUTO: 97 FL (ref 82–98)
MONOCYTES # BLD AUTO: 0.4 K/UL (ref 0.3–1)
MONOCYTES NFR BLD: 7.3 % (ref 4–15)
NEUTROPHILS # BLD AUTO: 3 K/UL (ref 1.8–7.7)
NEUTROPHILS NFR BLD: 55.4 % (ref 38–73)
NONHDLC SERPL-MCNC: 132 MG/DL
NRBC BLD-RTO: 0 /100 WBC
PLATELET # BLD AUTO: 227 K/UL (ref 150–350)
PMV BLD AUTO: 10.4 FL (ref 9.2–12.9)
POTASSIUM SERPL-SCNC: 4.2 MMOL/L (ref 3.5–5.1)
PROT SERPL-MCNC: 7.4 G/DL (ref 6–8.4)
RBC # BLD AUTO: 3.91 M/UL (ref 4–5.4)
SODIUM SERPL-SCNC: 145 MMOL/L (ref 136–145)
T3FREE SERPL-MCNC: 2.5 PG/ML (ref 2.3–4.2)
T4 FREE SERPL-MCNC: 0.82 NG/DL (ref 0.71–1.51)
TRIGL SERPL-MCNC: 176 MG/DL (ref 30–150)
TSH SERPL DL<=0.005 MIU/L-ACNC: 9.2 UIU/ML (ref 0.4–4)
WBC # BLD AUTO: 5.35 K/UL (ref 3.9–12.7)

## 2020-08-19 PROCEDURE — 99214 PR OFFICE/OUTPT VISIT, EST, LEVL IV, 30-39 MIN: ICD-10-PCS | Mod: S$GLB,,, | Performed by: INTERNAL MEDICINE

## 2020-08-19 PROCEDURE — 84481 FREE ASSAY (FT-3): CPT

## 2020-08-19 PROCEDURE — 85025 COMPLETE CBC W/AUTO DIFF WBC: CPT

## 2020-08-19 PROCEDURE — 83036 HEMOGLOBIN GLYCOSYLATED A1C: CPT

## 2020-08-19 PROCEDURE — 3008F PR BODY MASS INDEX (BMI) DOCUMENTED: ICD-10-PCS | Mod: CPTII,S$GLB,, | Performed by: INTERNAL MEDICINE

## 2020-08-19 PROCEDURE — 99999 PR PBB SHADOW E&M-EST. PATIENT-LVL IV: CPT | Mod: PBBFAC,,, | Performed by: INTERNAL MEDICINE

## 2020-08-19 PROCEDURE — 99214 OFFICE O/P EST MOD 30 MIN: CPT | Mod: S$GLB,,, | Performed by: INTERNAL MEDICINE

## 2020-08-19 PROCEDURE — 36415 COLL VENOUS BLD VENIPUNCTURE: CPT | Mod: PN

## 2020-08-19 PROCEDURE — 80053 COMPREHEN METABOLIC PANEL: CPT

## 2020-08-19 PROCEDURE — 80061 LIPID PANEL: CPT

## 2020-08-19 PROCEDURE — 84443 ASSAY THYROID STIM HORMONE: CPT

## 2020-08-19 PROCEDURE — 99999 PR PBB SHADOW E&M-EST. PATIENT-LVL IV: ICD-10-PCS | Mod: PBBFAC,,, | Performed by: INTERNAL MEDICINE

## 2020-08-19 PROCEDURE — 84439 ASSAY OF FREE THYROXINE: CPT

## 2020-08-19 PROCEDURE — 3008F BODY MASS INDEX DOCD: CPT | Mod: CPTII,S$GLB,, | Performed by: INTERNAL MEDICINE

## 2020-08-19 RX ORDER — BUSPIRONE HYDROCHLORIDE 5 MG/1
TABLET ORAL
Qty: 30 TABLET | Refills: 2 | Status: SHIPPED | OUTPATIENT
Start: 2020-08-19 | End: 2020-09-24

## 2020-08-19 RX ORDER — METOPROLOL SUCCINATE 25 MG/1
25 TABLET, EXTENDED RELEASE ORAL DAILY
Qty: 90 TABLET | Refills: 1 | Status: SHIPPED | OUTPATIENT
Start: 2020-08-19 | End: 2020-11-18 | Stop reason: SDUPTHER

## 2020-08-19 RX ORDER — AMLODIPINE BESYLATE 5 MG/1
5 TABLET ORAL DAILY
Qty: 30 TABLET | Refills: 2 | Status: SHIPPED | OUTPATIENT
Start: 2020-08-19 | End: 2020-11-18 | Stop reason: SDUPTHER

## 2020-08-19 NOTE — PATIENT INSTRUCTIONS
Uncontrolled hypertension:Maintain < 2 Gm Na a day diet, and monitor BP at home; keep a log.  Needs a nursing visit in 2 weeks to reassess her blood pressure.  Add amlodipine 5 mg a day for better blood pressure control  -     busPIRone (BUSPAR) 5 MG Tab; 5 mg p.o. t.i.d. as needed for anxiety  Dispense: 30 tablet; Refill: 2  -     amLODIPine (NORVASC) 5 MG tablet; Take 1 tablet (5 mg total) by mouth once daily.  Dispense: 30 tablet; Refill: 2  -     metoprolol succinate (TOPROL-XL) 25 MG 24 hr tablet; Take 1 tablet (25 mg total) by mouth once daily.  Dispense: 90 tablet; Refill: 1    Generalized anxiety disorder: Limit caffeine intake with stress reduction and regular exercise as tolerated.  -     amLODIPine (NORVASC) 5 MG tablet; Take 1 tablet (5 mg total) by mouth once daily.  Dispense: 30 tablet; Refill: 2    Panic attacks  -     amLODIPine (NORVASC) 5 MG tablet; Take 1 tablet (5 mg total) by mouth once daily.  Dispense: 30 tablet; Refill: 2    MVP (mitral valve prolapse):  Limit caffeine intake and exercise regularly.  On metoprolol;   -     busPIRone (BUSPAR) 5 MG Tab; 5 mg p.o. t.i.d. as needed for anxiety  Dispense: 30 tablet; Refill: 2    Impaired fasting blood sugar: Exercise recommended with weight reduction and low carb diet; we'll follow hemoglobin A1c's with you periodically.  -     Hemoglobin A1C; Future; Expected date: 08/19/2020    Postoperative hypothyroidism:  Takes her levothyroxine daily on empty stomach with no other food or medicines with it on taking it and for 1 hr later    Gastroesophageal reflux disease, esophagitis presence not specified: No bedtime snacks; weight reduction.  On a pantoprazole.    Transaminitis:  CMP pending for update.    Class 2 severe obesity due to excess calories with serious comorbidity and body mass index (BMI) of 39.0 to 39.9 in adult:  Regular exercise and caloric restriction help her weight come down.  Needs to watch her caloric intake.    Chronic pain syndrome:   Chronic neck pain managed by Dr. Acosta her pain physician.  Patient takes ibuprofen 800 mg 3 times a day as well as prescription meds per Dr. Acosta.  Keep her follow-up with him as directed.  Needs CBC kidney function and liver enzymes checked  -     CBC auto differential; Future; Expected date: 08/19/2020

## 2020-08-19 NOTE — PROGRESS NOTES
Subjective:       Patient ID: Kimberly Jolley is a 60 y.o. female.    Chief Complaint: No chief complaint on file.    HPI here today for reassessment.     Uncontrolled hypertension:  Needs to adhere to low-salt diet better blood pressure here manually is 178/98; add amlodipine 5 mg per day for better blood pressure control; and on metoprolol 25 mg a day which was renewed..      Hypothyroidism:  Takes levothyroxine with other medications which she has been advised need to be  by 1 hr and she needs to take it on empty stomach as well.  On a total of 175 mcg of levothyroxine daily.     Generalized anxiety disorder/panic attacks:  Stable except occasionally at night with panic attacks.  Will add BuSpar 5 mg p.o. t.i.d. as needed for anxiety or panic attack.     GERD:  Knows not to take any bedtime snacks; on pantoprazole 40 mg daily     Obesity:  BMI 39.25; knows the need for regular exercise and caloric restriction help her weight come down.  Keeps busy rehabbing small animals goal is for her to exercise 5 times a week at least 30 min.  Has taken off 9 lb since 04/24/2018     Fatty liver:  Adhering to her diet along with regular exercise and weight reduction will help her fatty level resolved.     Total time 8:03 a.m. to 8:48 a.m..  Greater than 50% of time spent in discussion, counseling, and review.    Review of Systems   Constitutional: Negative for appetite change and fever.   HENT: Negative for congestion, postnasal drip, rhinorrhea and sinus pressure.    Eyes: Negative for discharge and itching.   Respiratory: Negative for cough, chest tightness, shortness of breath and wheezing.    Cardiovascular: Negative for chest pain, palpitations and leg swelling.   Gastrointestinal: Negative for abdominal distention, abdominal pain, blood in stool, constipation, diarrhea, nausea and vomiting.   Endocrine: Negative for polydipsia, polyphagia and polyuria.   Genitourinary: Negative for dysuria and hematuria.    Musculoskeletal: Positive for neck pain. Negative for arthralgias and myalgias.        Sees Dr Acosta for pain management.  Some tingling that is chronic involving her fingers him suspected as due to neck issues   Skin: Negative for rash.   Allergic/Immunologic: Negative for environmental allergies and food allergies.   Neurological: Negative for tremors, seizures and syncope.   Hematological: Negative for adenopathy. Does not bruise/bleed easily.       Objective:      Vitals:    08/19/20 0751 08/19/20 0844   BP: (!) 144/100 (!) 178/98   BP Location: Right arm Right arm   Pulse: 80    Temp: 97.5 °F (36.4 °C)    TempSrc: Temporal    Weight: 91.2 kg (200 lb 15.2 oz)    Height: 5' (1.524 m)      Body mass index is 39.25 kg/m².  Wt Readings from Last 3 Encounters:   08/19/20 91.2 kg (200 lb 15.2 oz)   04/24/18 95.2 kg (209 lb 15.8 oz)   03/28/18 93.8 kg (206 lb 12.7 oz)        Physical Exam  Vitals signs reviewed.   Constitutional:       Appearance: She is well-developed.   HENT:      Head: Normocephalic and atraumatic.   Neck:      Musculoskeletal: Normal range of motion and neck supple.      Thyroid: No thyromegaly.      Comments: Csp tenderness to palp; good ROM but slight decrease extension. No bruits heard.   Cardiovascular:      Rate and Rhythm: Normal rate and regular rhythm.      Heart sounds: Normal heart sounds. No murmur. No gallop.    Pulmonary:      Effort: Pulmonary effort is normal. No respiratory distress.      Breath sounds: Normal breath sounds. No wheezing or rales.   Abdominal:      General: Bowel sounds are normal. There is no distension.      Palpations: Abdomen is soft.      Tenderness: There is no abdominal tenderness. There is no guarding or rebound.   Musculoskeletal: Normal range of motion.   Lymphadenopathy:      Cervical: No cervical adenopathy.   Skin:     Findings: No rash.   Neurological:      Mental Status: She is alert and oriented to person, place, and time.      Comments: Moves all 4  extremities fine.   Psychiatric:         Behavior: Behavior normal.         Thought Content: Thought content normal.         Assessment:       1. Uncontrolled hypertension    2. Generalized anxiety disorder    3. Panic attacks    4. MVP (mitral valve prolapse)    5. Impaired fasting blood sugar    6. Postoperative hypothyroidism    7. Gastroesophageal reflux disease, esophagitis presence not specified    8. Transaminitis    9. Class 2 severe obesity due to excess calories with serious comorbidity and body mass index (BMI) of 39.0 to 39.9 in adult    10. Chronic pain syndrome        Plan:       Uncontrolled hypertension:Maintain < 2 Gm Na a day diet, and monitor BP at home; keep a log.  Needs a nursing visit in 2 weeks to reassess her blood pressure.  Add amlodipine 5 mg a day for better blood pressure control; continue metoprolol 25 mg daily  -     busPIRone (BUSPAR) 5 MG Tab; 5 mg p.o. t.i.d. as needed for anxiety  Dispense: 30 tablet; Refill: 2  -     amLODIPine (NORVASC) 5 MG tablet; Take 1 tablet (5 mg total) by mouth once daily.  Dispense: 30 tablet; Refill: 2  -     metoprolol succinate (TOPROL-XL) 25 MG 24 hr tablet; Take 1 tablet (25 mg total) by mouth once daily.  Dispense: 90 tablet; Refill: 1    Generalized anxiety disorder: Limit caffeine intake with stress reduction and regular exercise as tolerated.  Have added BuSpar 5 mg t.i.d. as needed for anxiety  -     amLODIPine (NORVASC) 5 MG tablet; Take 1 tablet (5 mg total) by mouth once daily.  Dispense: 30 tablet; Refill: 2    Panic attacks:  Have added BuSpar 5 mg p.o. t.i.d. as needed for anxiety or panic attacks  -     amLODIPine (NORVASC) 5 MG tablet; Take 1 tablet (5 mg total) by mouth once daily.  Dispense: 30 tablet; Refill: 2    MVP (mitral valve prolapse):  Limit caffeine intake and exercise regularly.  On metoprolol;   -     busPIRone (BUSPAR) 5 MG Tab; 5 mg p.o. t.i.d. as needed for anxiety  Dispense: 30 tablet; Refill: 2    Impaired fasting  blood sugar: Exercise recommended with weight reduction and low carb diet; we'll follow hemoglobin A1c's with you periodically.  -     Hemoglobin A1C; Future; Expected date: 08/19/2020    Postoperative hypothyroidism:  Advised of the need to take her levothyroxine daily on empty stomach with no other food or medicines with it on taking it and for 1 hr later    Gastroesophageal reflux disease, esophagitis presence not specified: No bedtime snacks; weight reduction.  On a pantoprazole.    Transaminitis:  CMP pending for update.  Limit alcohol and NSA ID and Tylenol use.  Keep well hydrated during the day;     Class 2 severe obesity due to excess calories with serious comorbidity and body mass index (BMI) of 39.0 to 39.9 in adult:  Regular exercise and caloric restriction help her weight come down.  Needs to watch her caloric intake.    Chronic pain syndrome:  Chronic neck pain managed by Dr. Acosta her pain physician.  Patient takes ibuprofen 800 mg 3 times a day as well as prescription meds per Dr. Acosta.  Keep her follow-up with him as directed.  Needs CBC, kidney function and liver enzymes checked  -     CBC auto differential; Future; Expected date: 08/19/2020

## 2020-08-20 DIAGNOSIS — Z12.11 COLON CANCER SCREENING: ICD-10-CM

## 2020-08-20 DIAGNOSIS — Z12.39 BREAST CANCER SCREENING: ICD-10-CM

## 2020-08-20 LAB
ESTIMATED AVG GLUCOSE: 120 MG/DL (ref 68–131)
ESTIMATED AVG GLUCOSE: 120 MG/DL (ref 68–131)
HBA1C MFR BLD HPLC: 5.8 % (ref 4–5.6)
HBA1C MFR BLD HPLC: 5.8 % (ref 4–5.6)

## 2020-08-24 ENCOUNTER — TELEPHONE (OUTPATIENT)
Dept: FAMILY MEDICINE | Facility: CLINIC | Age: 60
End: 2020-08-24

## 2020-08-24 DIAGNOSIS — E03.9 HYPOTHYROIDISM, UNSPECIFIED TYPE: Primary | ICD-10-CM

## 2020-08-24 RX ORDER — LEVOTHYROXINE SODIUM 150 UG/1
150 TABLET ORAL
Qty: 90 TABLET | Refills: 1 | Status: SHIPPED | OUTPATIENT
Start: 2020-08-24 | End: 2021-02-23 | Stop reason: SDUPTHER

## 2020-08-24 RX ORDER — LEVOTHYROXINE SODIUM 25 UG/1
25 TABLET ORAL
Qty: 90 TABLET | Refills: 1 | Status: SHIPPED | OUTPATIENT
Start: 2020-08-24 | End: 2020-09-30

## 2020-08-24 NOTE — TELEPHONE ENCOUNTER
----- Message from Manuela aMncera sent at 8/24/2020  9:22 AM CDT -----  Regarding: refill  Contact: patient  Type: Needs Medical Advice  Who Called:  self  Symptoms (please be specific):    How long has patient had these symptoms:    Pharmacy name and phone #:    [id number] - [place], [place] - [address] [address] AT [facility] [address] [address]  [address] [address] [address]  Phone: [phone] Fax: [phone]    Best Call Back Number:[phone]  Additional Information: Patient states she came in on 08/19/20 and is waiting on her refill of levothyroxine (SYNTHROID) . She states the doctor was going to change the strength due to her levels. She says the pharmacy has not received a request and she is out of medication for 2 weeks. Patient says she is starting to feel very bad.  Please call patient to advise. Thanks!

## 2020-08-24 NOTE — TELEPHONE ENCOUNTER
Discussed with patient by phone levothyroxine was not sent in as we were awaiting new thyroid function test; last thyroid function test available were 04/18/2018; stressed the importance of patient keeping her follow ups and obtaining her labs earlier to her.  Wanted to have her thyroid function test updated results before prescribing her levothyroxine.  Will increase her half of a 25 mcg tablet to a whole 25 mcg tablet daily and continue her on 150 mcg per day.    Will recheck thyroid function test in 6 weeks for reassessment.  Please schedule her thyroid function test for 6 week follow-up visit

## 2020-08-30 ENCOUNTER — TELEPHONE (OUTPATIENT)
Dept: FAMILY MEDICINE | Facility: CLINIC | Age: 60
End: 2020-08-30

## 2020-08-30 NOTE — TELEPHONE ENCOUNTER
Please notify patient I have reviewed her labs from the 19th and there is no significant abnormalities except her triglycerides 176 and she needs to be on low-fat high-fiber diet; limit alcohol and dairy products as well as also perform regular exercise as well.  She also needs to take her thyroid supplements as we discussed in the office on empty stomach with no other medicines or food with them so they can get absorbed adequately and no other food or medicine  till an hour later; her TSH was elevated at 9.2 and needs to be less than 3.  Please also schedule a  TSH with free T3 and free T4 for her follow-up 1 week prior for reassessment; these have been ordered

## 2020-08-31 NOTE — TELEPHONE ENCOUNTER
Spoke with pt and advised of lab results. Pt verbalized understanding.   Labs scheduled and pt confirmed date and time.

## 2020-09-02 ENCOUNTER — CLINICAL SUPPORT (OUTPATIENT)
Dept: FAMILY MEDICINE | Facility: CLINIC | Age: 60
End: 2020-09-02
Payer: COMMERCIAL

## 2020-09-02 VITALS — HEART RATE: 64 BPM | SYSTOLIC BLOOD PRESSURE: 136 MMHG | DIASTOLIC BLOOD PRESSURE: 86 MMHG

## 2020-09-02 DIAGNOSIS — Z01.30 BP CHECK: Primary | ICD-10-CM

## 2020-09-02 PROCEDURE — 99499 UNLISTED E&M SERVICE: CPT | Mod: S$GLB,,, | Performed by: INTERNAL MEDICINE

## 2020-09-02 PROCEDURE — 99999 PR PBB SHADOW E&M-EST. PATIENT-LVL I: ICD-10-PCS | Mod: PBBFAC,,,

## 2020-09-02 PROCEDURE — 99499 NO LOS: ICD-10-PCS | Mod: S$GLB,,, | Performed by: INTERNAL MEDICINE

## 2020-09-02 PROCEDURE — 99999 PR PBB SHADOW E&M-EST. PATIENT-LVL I: CPT | Mod: PBBFAC,,,

## 2020-09-02 NOTE — PROGRESS NOTES
BP Left 142/94, HR 64    Blood pressure reading after 15 minutes was 136/86.  Dr. Seo notified.    Kimberly Jolley 60 y.o. female is here today for Blood Pressure check.   History of HTN no.    Review of patient's allergies indicates:   Allergen Reactions    Asa [aspirin]     Nsaids (non-steroidal anti-inflammatory drug)      Creatinine   Date Value Ref Range Status   08/19/2020 0.8 0.5 - 1.4 mg/dL Final     Sodium   Date Value Ref Range Status   08/19/2020 145 136 - 145 mmol/L Final     Potassium   Date Value Ref Range Status   08/19/2020 4.2 3.5 - 5.1 mmol/L Final   ]  Patient verifies taking blood pressure medications on a regular basis at the same time of the day.     Current Outpatient Medications:     amLODIPine (NORVASC) 5 MG tablet, Take 1 tablet (5 mg total) by mouth once daily., Disp: 30 tablet, Rfl: 2    busPIRone (BUSPAR) 5 MG Tab, 5 mg p.o. t.i.d. as needed for anxiety, Disp: 30 tablet, Rfl: 2    gabapentin (NEURONTIN) 300 MG capsule, Take by mouth. 1 tablet in the am and 2 tablets at night, Disp: , Rfl: 5    ibuprofen (ADVIL,MOTRIN) 200 MG tablet, Take 200 mg by mouth As instructed for Pain. Pt takes 3 tablets bid, Disp: , Rfl:     levothyroxine (SYNTHROID) 150 MCG tablet, Take 1 tablet (150 mcg total) by mouth before breakfast., Disp: 90 tablet, Rfl: 1    levothyroxine (SYNTHROID) 25 MCG tablet, Take 1 tablet (25 mcg total) by mouth before breakfast., Disp: 90 tablet, Rfl: 1    methocarbamol (ROBAXIN) 750 MG Tab, Take 1,500 mg by mouth 3 (three) times daily. , Disp: , Rfl: 0    metoprolol succinate (TOPROL-XL) 25 MG 24 hr tablet, Take 1 tablet (25 mg total) by mouth once daily., Disp: 90 tablet, Rfl: 1    oxyCODONE (ROXICODONE) 10 mg Tab immediate release tablet, Take 10 mg by mouth 3 (three) times daily., Disp: , Rfl:     pantoprazole (PROTONIX) 40 MG tablet, Take 1 tablet (40 mg total) by mouth once daily., Disp: 30 tablet, Rfl: 2  Does patient have record of home blood pressure  "readings no. Gave pt a BP log to take home and instructed pt to check once daily. Pt verbalized understanding.   Last dose of blood pressure medication was taken at yesterday morning 9/1/20 @ 10am. Pt has not taking medication yet today.  Patient is asymptomatic.   Complains of "feeling of her heart pounding/pulsating in middle of the night in hands and chest". Denies CP. Denies SOB.     "

## 2020-09-16 ENCOUNTER — PATIENT OUTREACH (OUTPATIENT)
Dept: ADMINISTRATIVE | Facility: HOSPITAL | Age: 60
End: 2020-09-16

## 2020-09-16 NOTE — PROGRESS NOTES
Health Maintenance Due   Topic Date Due    HIV Screening  1975    Pneumococcal Vaccine (Medium Risk) (1 of 1 - PPSV23) 1979    Shingles Vaccine (1 of 2) 2010    Colorectal Cancer Screening  2019    Mammogram  2020    Influenza Vaccine (1) 2020       Chart review completed 2020.  Care Everywhere updates requested and reviewed.  Immunizations reconciled. Media reports reviewed.  Duplicate HM overrides and  orders removed.  Overdue HM topic chart audit and/or requested.  Overdue lab testing linked to upcoming lab appointments if applies.

## 2020-09-23 ENCOUNTER — LAB VISIT (OUTPATIENT)
Dept: LAB | Facility: HOSPITAL | Age: 60
End: 2020-09-23
Attending: INTERNAL MEDICINE
Payer: COMMERCIAL

## 2020-09-23 DIAGNOSIS — E03.9 HYPOTHYROIDISM, UNSPECIFIED TYPE: ICD-10-CM

## 2020-09-23 PROCEDURE — 84439 ASSAY OF FREE THYROXINE: CPT

## 2020-09-23 PROCEDURE — 84443 ASSAY THYROID STIM HORMONE: CPT

## 2020-09-23 PROCEDURE — 36415 COLL VENOUS BLD VENIPUNCTURE: CPT | Mod: PN

## 2020-09-23 PROCEDURE — 84481 FREE ASSAY (FT-3): CPT

## 2020-09-24 LAB
T3FREE SERPL-MCNC: 2.4 PG/ML (ref 2.3–4.2)
T4 FREE SERPL-MCNC: 1.21 NG/DL (ref 0.71–1.51)
TSH SERPL DL<=0.005 MIU/L-ACNC: 5.08 UIU/ML (ref 0.4–4)

## 2020-09-30 ENCOUNTER — OFFICE VISIT (OUTPATIENT)
Dept: FAMILY MEDICINE | Facility: CLINIC | Age: 60
End: 2020-09-30
Payer: COMMERCIAL

## 2020-09-30 VITALS
SYSTOLIC BLOOD PRESSURE: 138 MMHG | BODY MASS INDEX: 38.14 KG/M2 | OXYGEN SATURATION: 98 % | DIASTOLIC BLOOD PRESSURE: 84 MMHG | HEART RATE: 73 BPM | TEMPERATURE: 98 F | WEIGHT: 194.25 LBS | HEIGHT: 60 IN

## 2020-09-30 DIAGNOSIS — E89.0 POSTOPERATIVE HYPOTHYROIDISM: ICD-10-CM

## 2020-09-30 DIAGNOSIS — F41.1 GENERALIZED ANXIETY DISORDER: ICD-10-CM

## 2020-09-30 DIAGNOSIS — I10 ESSENTIAL HYPERTENSION: Primary | ICD-10-CM

## 2020-09-30 DIAGNOSIS — K21.9 GASTROESOPHAGEAL REFLUX DISEASE, ESOPHAGITIS PRESENCE NOT SPECIFIED: ICD-10-CM

## 2020-09-30 DIAGNOSIS — I34.1 MVP (MITRAL VALVE PROLAPSE): ICD-10-CM

## 2020-09-30 DIAGNOSIS — R73.01 IMPAIRED FASTING BLOOD SUGAR: ICD-10-CM

## 2020-09-30 DIAGNOSIS — N28.1 RENAL CYST, LEFT: ICD-10-CM

## 2020-09-30 DIAGNOSIS — E78.1 HYPERTRIGLYCERIDEMIA: ICD-10-CM

## 2020-09-30 DIAGNOSIS — R93.2 ABNORMAL LIVER ULTRASOUND: ICD-10-CM

## 2020-09-30 DIAGNOSIS — E66.01 CLASS 2 SEVERE OBESITY DUE TO EXCESS CALORIES WITH SERIOUS COMORBIDITY AND BODY MASS INDEX (BMI) OF 37.0 TO 37.9 IN ADULT: ICD-10-CM

## 2020-09-30 DIAGNOSIS — F43.9 SITUATIONAL STRESS: ICD-10-CM

## 2020-09-30 DIAGNOSIS — R00.2 HEART PALPITATIONS: ICD-10-CM

## 2020-09-30 DIAGNOSIS — F41.0 PANIC ATTACKS: ICD-10-CM

## 2020-09-30 PROCEDURE — 99214 PR OFFICE/OUTPT VISIT, EST, LEVL IV, 30-39 MIN: ICD-10-PCS | Mod: S$GLB,,, | Performed by: INTERNAL MEDICINE

## 2020-09-30 PROCEDURE — 99214 OFFICE O/P EST MOD 30 MIN: CPT | Mod: S$GLB,,, | Performed by: INTERNAL MEDICINE

## 2020-09-30 PROCEDURE — 3008F BODY MASS INDEX DOCD: CPT | Mod: CPTII,S$GLB,, | Performed by: INTERNAL MEDICINE

## 2020-09-30 PROCEDURE — 99999 PR PBB SHADOW E&M-EST. PATIENT-LVL IV: CPT | Mod: PBBFAC,,, | Performed by: INTERNAL MEDICINE

## 2020-09-30 PROCEDURE — 99999 PR PBB SHADOW E&M-EST. PATIENT-LVL IV: ICD-10-PCS | Mod: PBBFAC,,, | Performed by: INTERNAL MEDICINE

## 2020-09-30 PROCEDURE — 3008F PR BODY MASS INDEX (BMI) DOCUMENTED: ICD-10-PCS | Mod: CPTII,S$GLB,, | Performed by: INTERNAL MEDICINE

## 2020-09-30 RX ORDER — ACETAMINOPHEN 500 MG
1000 TABLET ORAL 3 TIMES DAILY
COMMUNITY

## 2020-09-30 RX ORDER — LEVOTHYROXINE SODIUM 75 UG/1
TABLET ORAL
Qty: 45 TABLET | Refills: 1 | Status: SHIPPED | OUTPATIENT
Start: 2020-09-30 | End: 2020-12-07 | Stop reason: SDUPTHER

## 2020-09-30 NOTE — PATIENT INSTRUCTIONS
Essential hypertension: Maintain < 2 Gm Na a day diet, and monitor BP at home; keep a log. Add metoprolol succinate 1/2 of 25 mg po every 4-6 pm.  -     Hemoglobin A1C; Future; Expected date: 09/30/2020  -     Comprehensive metabolic panel; Future; Expected date: 09/30/2020  -     Lipid Panel; Future; Expected date: 09/30/2020    Generalized anxiety disorder: Limit caffeine intake with stress reduction and regular exercise as tolerated. Buspar as needed.     Heart palpitations: limit caffeine; increase in metoprolol should help; exercise w stress reduction    MVP (mitral valve prolapse)    Panic attacks: has buspar to use as needed. Increase metoprolol should help    Situational stress: as above. Stable.     Postoperative hypothyroidism: increase levothyroxine from 150 to 187 mcg po daily.   -     levothyroxine (SYNTHROID) 75 MCG tablet; 1/2 of 75 mcg po daily. Take w 150 mcg a day dose of levothyroxine. .  Dispense: 45 tablet; Refill: 1  -     T3, free; Future; Expected date: 09/30/2020  -     T4, free; Future; Expected date: 09/30/2020  -     TSH; Future; Expected date: 09/30/2020    Hypertriglyceridemia: Maintain low fat high fiber diet, exercise regularly. Weight reduction where indicated. Limit dairy; exercise.   -     Comprehensive metabolic panel; Future; Expected date: 09/30/2020  -     Lipid Panel; Future; Expected date: 09/30/2020    Impaired fasting blood sugar: Exercise recommended with weight reduction and low carb diet; we'll follow hemoglobin A1c's with you periodically.  -     Hemoglobin A1C; Future; Expected date: 09/30/2020  -     Comprehensive metabolic panel; Future; Expected date: 09/30/2020    Fatty liver: exercise w eeight reduction; low fat  -     Comprehensive metabolic panel; Future; Expected date: 09/30/2020    Gastroesophageal reflux disease, esophagitis presence not specified    Class 2 severe obesity due to excess calories with serious comorbidity and body mass index (BMI) of 37.0 to  37.9 in adult  -     levothyroxine (SYNTHROID) 75 MCG tablet; 1/2 of 75 mcg po daily. Take w 150 mcg a day dose of levothyroxine. .  Dispense: 45 tablet; Refill: 1  -     T3, free; Future; Expected date: 09/30/2020  -     T4, free; Future; Expected date: 09/30/2020  -     TSH; Future; Expected date: 09/30/2020

## 2020-09-30 NOTE — PROGRESS NOTES
Subjective:       Patient ID: Kimberly Jolley is a 60 y.o. female.    Chief Complaint: Follow-up (review lab results)    HPI here today for reassessment and go over labs.     Hypertension:  Needs to start monitoring at home again and keep a log for office review; manually she is 138/84; on metoprolol 25 mg a day; add 12.5 mg every 4-6 p.m..     Hypertriglyceridemia:  Low-fat high-fiber diet with particular attention to limiting dairy products.  Regular exercise and weight reduction will also help     Generalized anxiety disorder:  Stable; doing fair; has BuSpar to use as needed for anxiety;      Panic attacks:  Not lately occasionally in the middle of the night.  Has BuSpar to use as needed.     Postop hypothyroidism:  Uses levothyroxine appropriately; stop mixing her thyroid medicine with other meds around 5 weeks ago presently on a total of 175 mcg per day; as 150 mcg and 25 mcg levothyroxine daily.  TSH 5.08/free T4 at 1.21/free T3 at 2.4.     Transaminitis:  No alcohol; limited ibuprofen use.     GERD:  Uses pantoprazole 40 mg a day as needed for reflux; has not been needing it.     Impaired fasting blood sugar:  Watches her carbohydrates but could be better.  Weight reduction will also help     Obesity:  BMI 37.93; needs to be on a regular exercise regimen with small portions to help weight come down.  Has taken off 7 lb since 08/19/2020.  Keeps busy around the house raising squirrels.     Left renal cyst:  Needs ultrasound of the abdomen repeated for updated evaluation.     Total time 9:00 a.m. to 9:47 a.m..  Greater than 50% of time spent in discussion, counseling, and review.  Labs reviewed with patient at length in ordered for follow-up.  Additional 15 min spent on supplemental documentation and review afterwards.    Review of Systems   Constitutional: Negative for appetite change and fever.   HENT: Negative for congestion, postnasal drip, rhinorrhea and sinus pressure.    Eyes: Negative for discharge and  itching.   Respiratory: Negative for cough, chest tightness, shortness of breath and wheezing.    Cardiovascular: Positive for palpitations. Negative for chest pain and leg swelling.        Rare palp in middle of night.    Gastrointestinal: Negative for abdominal distention, abdominal pain, blood in stool, constipation, diarrhea, nausea and vomiting.   Endocrine: Negative for polydipsia, polyphagia and polyuria.   Genitourinary: Negative for dysuria and hematuria.   Musculoskeletal: Negative for arthralgias and myalgias.   Skin: Negative for rash.   Allergic/Immunologic: Negative for environmental allergies and food allergies.   Neurological: Negative for tremors, seizures and syncope.   Hematological: Negative for adenopathy. Does not bruise/bleed easily.       Objective:      Vitals:    09/30/20 0859   BP: 138/84   BP Location: Right arm   Patient Position: Sitting   BP Method: Large (Manual)   Pulse: 73   Temp: 97.7 °F (36.5 °C)   TempSrc: Temporal   SpO2: 98%   Weight: 88.1 kg (194 lb 3.6 oz)   Height: 5' (1.524 m)     Body mass index is 37.93 kg/m².  Wt Readings from Last 3 Encounters:   09/30/20 88.1 kg (194 lb 3.6 oz)   08/19/20 91.2 kg (200 lb 15.2 oz)   04/24/18 95.2 kg (209 lb 15.8 oz)        Physical Exam  Vitals signs reviewed.   Constitutional:       Appearance: She is well-developed.   HENT:      Head: Normocephalic and atraumatic.   Neck:      Musculoskeletal: Normal range of motion and neck supple.      Thyroid: No thyromegaly.      Vascular: No carotid bruit.   Cardiovascular:      Rate and Rhythm: Normal rate and regular rhythm.      Heart sounds: Normal heart sounds. No murmur. No gallop.    Pulmonary:      Effort: Pulmonary effort is normal. No respiratory distress.      Breath sounds: Normal breath sounds. No wheezing or rales.   Abdominal:      General: Bowel sounds are normal. There is no distension.      Palpations: Abdomen is soft.      Tenderness: There is no abdominal tenderness. There is  no guarding or rebound.   Musculoskeletal: Normal range of motion.      Right lower leg: No edema.      Left lower leg: No edema.   Lymphadenopathy:      Cervical: No cervical adenopathy.   Skin:     Findings: No rash.   Neurological:      Mental Status: She is alert and oriented to person, place, and time.      Comments: Moves all 4 extremities fine.   Psychiatric:         Behavior: Behavior normal.         Thought Content: Thought content normal.         Assessment:       1. Essential hypertension    2. Generalized anxiety disorder    3. Heart palpitations    4. MVP (mitral valve prolapse)    5. Panic attacks    6. Situational stress    7. Postoperative hypothyroidism    8. Hypertriglyceridemia    9. Impaired fasting blood sugar    10. Fatty liver    11. Gastroesophageal reflux disease, esophagitis presence not specified    12. Class 2 severe obesity due to excess calories with serious comorbidity and body mass index (BMI) of 37.0 to 37.9 in adult    13. Renal cyst, left        Plan:       Essential hypertension: Maintain < 2 Gm Na a day diet, and monitor BP at home; keep a log. Add metoprolol succinate 1/2 of 25 mg po every 4-6 pm.  Continue metoprolol succinate 25 mg every morning  -     Hemoglobin A1C; Future; Expected date: 09/30/2020  -     Comprehensive metabolic panel; Future; Expected date: 09/30/2020  -     Lipid Panel; Future; Expected date: 09/30/2020    Generalized anxiety disorder: Limit caffeine intake with stress reduction and regular exercise as tolerated. Buspar as needed for anxiety..     Heart palpitations: limit caffeine; increase in metoprolol should help; exercise w stress reduction    MVP (mitral valve prolapse)    Panic attacks: has buspar to use as needed. Increase metoprolol should help limit caffeine intake and regular exercise and stress reduction should help    Situational stress: as above. Stable.     Postoperative hypothyroidism: increase levothyroxine from 175 to 187 mcg po daily.    -     levothyroxine (SYNTHROID) 75 MCG tablet; 1/2 of 75 mcg po daily. Take w 150 mcg a day dose of levothyroxine. .  Dispense: 45 tablet; Refill: 1  -     T3, free; Future; Expected date: 09/30/2020  -     T4, free; Future; Expected date: 09/30/2020  -     TSH; Future; Expected date: 09/30/2020    Hypertriglyceridemia: Maintain low fat high fiber diet, exercise regularly. Weight reduction where indicated. Limit dairy; exercise.   -     Comprehensive metabolic panel; Future; Expected date: 09/30/2020  -     Lipid Panel; Future; Expected date: 09/30/2020    Impaired fasting blood sugar: Exercise recommended with weight reduction and low carb diet; we'll follow hemoglobin A1c's with you periodically.  -     Hemoglobin A1C; Future; Expected date: 09/30/2020  -     Comprehensive metabolic panel; Future; Expected date: 09/30/2020    Abnormal liver ultrasound:  Noted by the radiologist 03/09/2018 abdominal ultrasound with liver showing increased echogenicity as can be seen with fatty liver and also hepatomegaly.  Will repeat ultrasound the abdomen for reassessment  -     Comprehensive metabolic panel; Future; Expected date: 09/30/2020    Left renal cyst:  Noted on ultrasound the abdomen 03/09/2018; will repeat abdominal ultrasound for reassessment    Gastroesophageal reflux disease, esophagitis presence not specified    Class 2 severe obesity due to excess calories with serious comorbidity and body mass index (BMI) of 37.0 to 37.9 in adult  -     levothyroxine (SYNTHROID) 75 MCG tablet; 1/2 of 75 mcg po daily. Take w 150 mcg a day dose of levothyroxine. .  Dispense: 45 tablet; Refill: 1  -     T3, free; Future; Expected date: 09/30/2020  -     T4, free; Future; Expected date: 09/30/2020  -     TSH; Future; Expected date: 09/30/2020

## 2020-10-05 ENCOUNTER — PATIENT MESSAGE (OUTPATIENT)
Dept: ADMINISTRATIVE | Facility: HOSPITAL | Age: 60
End: 2020-10-05

## 2020-10-30 ENCOUNTER — PATIENT MESSAGE (OUTPATIENT)
Dept: ADMINISTRATIVE | Facility: HOSPITAL | Age: 60
End: 2020-10-30

## 2020-11-18 DIAGNOSIS — F41.1 GENERALIZED ANXIETY DISORDER: ICD-10-CM

## 2020-11-18 DIAGNOSIS — I10 UNCONTROLLED HYPERTENSION: ICD-10-CM

## 2020-11-18 DIAGNOSIS — F41.0 PANIC ATTACKS: ICD-10-CM

## 2020-11-18 NOTE — TELEPHONE ENCOUNTER
----- Message from Laurafanny Lawrence sent at 11/18/2020  1:45 PM CST -----  Regarding: New Rx  Contact: pt  Type: Needs Medical Advice    Who Called:      Best Call Back Number:     Additional Information: Requesting a call back from Nurse, regarding a new Rx for metoprolol succinate (TOPROL-XL) 25 MG 24 hr  for 1 tab in am and half in evening and pt needs the Rx send over she has ran out and needs a call back ,please call       amLODIPine (NORVASC) 5 MG tablet 30 tablet has been requested since Monday please send this in as well

## 2020-11-18 NOTE — TELEPHONE ENCOUNTER
Patient requesting refill of blood pressure medications.    LOV- 09/30/2020  Future OV- 12/07/2020  Last refill - 08/19/2020      Please review. Thank you.

## 2020-11-20 RX ORDER — AMLODIPINE BESYLATE 5 MG/1
5 TABLET ORAL DAILY
Qty: 90 TABLET | Refills: 3 | Status: SHIPPED | OUTPATIENT
Start: 2020-11-20 | End: 2021-11-18 | Stop reason: SDUPTHER

## 2020-11-20 RX ORDER — METOPROLOL SUCCINATE 25 MG/1
25 TABLET, EXTENDED RELEASE ORAL DAILY
Qty: 90 TABLET | Refills: 3 | Status: SHIPPED | OUTPATIENT
Start: 2020-11-20 | End: 2020-12-07

## 2020-11-30 ENCOUNTER — LAB VISIT (OUTPATIENT)
Dept: LAB | Facility: HOSPITAL | Age: 60
End: 2020-11-30
Attending: INTERNAL MEDICINE
Payer: COMMERCIAL

## 2020-11-30 DIAGNOSIS — E78.1 HYPERTRIGLYCERIDEMIA: ICD-10-CM

## 2020-11-30 DIAGNOSIS — R93.2 ABNORMAL LIVER ULTRASOUND: ICD-10-CM

## 2020-11-30 DIAGNOSIS — I10 ESSENTIAL HYPERTENSION: ICD-10-CM

## 2020-11-30 DIAGNOSIS — E66.01 CLASS 2 SEVERE OBESITY DUE TO EXCESS CALORIES WITH SERIOUS COMORBIDITY AND BODY MASS INDEX (BMI) OF 37.0 TO 37.9 IN ADULT: ICD-10-CM

## 2020-11-30 DIAGNOSIS — R73.01 IMPAIRED FASTING BLOOD SUGAR: ICD-10-CM

## 2020-11-30 DIAGNOSIS — E89.0 POSTOPERATIVE HYPOTHYROIDISM: ICD-10-CM

## 2020-11-30 LAB
ALBUMIN SERPL BCP-MCNC: 4 G/DL (ref 3.5–5.2)
ALP SERPL-CCNC: 81 U/L (ref 55–135)
ALT SERPL W/O P-5'-P-CCNC: 31 U/L (ref 10–44)
ANION GAP SERPL CALC-SCNC: 9 MMOL/L (ref 8–16)
AST SERPL-CCNC: 22 U/L (ref 10–40)
BILIRUB SERPL-MCNC: 0.4 MG/DL (ref 0.1–1)
BUN SERPL-MCNC: 12 MG/DL (ref 6–20)
CALCIUM SERPL-MCNC: 9.2 MG/DL (ref 8.7–10.5)
CHLORIDE SERPL-SCNC: 105 MMOL/L (ref 95–110)
CHOLEST SERPL-MCNC: 179 MG/DL (ref 120–199)
CHOLEST/HDLC SERPL: 3.7 {RATIO} (ref 2–5)
CO2 SERPL-SCNC: 28 MMOL/L (ref 23–29)
CREAT SERPL-MCNC: 0.8 MG/DL (ref 0.5–1.4)
EST. GFR  (AFRICAN AMERICAN): >60 ML/MIN/1.73 M^2
EST. GFR  (NON AFRICAN AMERICAN): >60 ML/MIN/1.73 M^2
ESTIMATED AVG GLUCOSE: 120 MG/DL (ref 68–131)
GLUCOSE SERPL-MCNC: 110 MG/DL (ref 70–110)
HBA1C MFR BLD HPLC: 5.8 % (ref 4–5.6)
HDLC SERPL-MCNC: 49 MG/DL (ref 40–75)
HDLC SERPL: 27.4 % (ref 20–50)
LDLC SERPL CALC-MCNC: 81.4 MG/DL (ref 63–159)
NONHDLC SERPL-MCNC: 130 MG/DL
POTASSIUM SERPL-SCNC: 4.2 MMOL/L (ref 3.5–5.1)
PROT SERPL-MCNC: 7.7 G/DL (ref 6–8.4)
SODIUM SERPL-SCNC: 142 MMOL/L (ref 136–145)
T3FREE SERPL-MCNC: 2.5 PG/ML (ref 2.3–4.2)
T4 FREE SERPL-MCNC: 1.12 NG/DL (ref 0.71–1.51)
TRIGL SERPL-MCNC: 243 MG/DL (ref 30–150)
TSH SERPL DL<=0.005 MIU/L-ACNC: 2.4 UIU/ML (ref 0.4–4)

## 2020-11-30 PROCEDURE — 84481 FREE ASSAY (FT-3): CPT

## 2020-11-30 PROCEDURE — 36415 COLL VENOUS BLD VENIPUNCTURE: CPT | Mod: PN

## 2020-11-30 PROCEDURE — 84443 ASSAY THYROID STIM HORMONE: CPT

## 2020-11-30 PROCEDURE — 84439 ASSAY OF FREE THYROXINE: CPT

## 2020-11-30 PROCEDURE — 80061 LIPID PANEL: CPT

## 2020-11-30 PROCEDURE — 83036 HEMOGLOBIN GLYCOSYLATED A1C: CPT

## 2020-11-30 PROCEDURE — 80053 COMPREHEN METABOLIC PANEL: CPT

## 2020-12-07 ENCOUNTER — OFFICE VISIT (OUTPATIENT)
Dept: FAMILY MEDICINE | Facility: CLINIC | Age: 60
End: 2020-12-07
Payer: COMMERCIAL

## 2020-12-07 VITALS
TEMPERATURE: 98 F | BODY MASS INDEX: 39.25 KG/M2 | WEIGHT: 199.94 LBS | OXYGEN SATURATION: 99 % | SYSTOLIC BLOOD PRESSURE: 186 MMHG | DIASTOLIC BLOOD PRESSURE: 98 MMHG | HEART RATE: 78 BPM | HEIGHT: 60 IN

## 2020-12-07 DIAGNOSIS — E89.0 POSTOPERATIVE HYPOTHYROIDISM: ICD-10-CM

## 2020-12-07 DIAGNOSIS — R00.2 HEART PALPITATIONS: ICD-10-CM

## 2020-12-07 DIAGNOSIS — K21.9 GASTROESOPHAGEAL REFLUX DISEASE, UNSPECIFIED WHETHER ESOPHAGITIS PRESENT: ICD-10-CM

## 2020-12-07 DIAGNOSIS — R73.01 IMPAIRED FASTING BLOOD SUGAR: ICD-10-CM

## 2020-12-07 DIAGNOSIS — E78.5 DYSLIPIDEMIA: ICD-10-CM

## 2020-12-07 DIAGNOSIS — E78.2 MIXED HYPERLIPIDEMIA: ICD-10-CM

## 2020-12-07 DIAGNOSIS — F41.0 PANIC ATTACKS: ICD-10-CM

## 2020-12-07 DIAGNOSIS — I10 UNCONTROLLED HYPERTENSION: Primary | ICD-10-CM

## 2020-12-07 DIAGNOSIS — F41.1 GENERALIZED ANXIETY DISORDER: ICD-10-CM

## 2020-12-07 DIAGNOSIS — E66.01 CLASS 2 SEVERE OBESITY DUE TO EXCESS CALORIES WITH SERIOUS COMORBIDITY AND BODY MASS INDEX (BMI) OF 39.0 TO 39.9 IN ADULT: ICD-10-CM

## 2020-12-07 PROCEDURE — 99214 OFFICE O/P EST MOD 30 MIN: CPT | Mod: S$GLB,,, | Performed by: INTERNAL MEDICINE

## 2020-12-07 PROCEDURE — 99214 PR OFFICE/OUTPT VISIT, EST, LEVL IV, 30-39 MIN: ICD-10-PCS | Mod: S$GLB,,, | Performed by: INTERNAL MEDICINE

## 2020-12-07 PROCEDURE — 99999 PR PBB SHADOW E&M-EST. PATIENT-LVL IV: ICD-10-PCS | Mod: PBBFAC,,, | Performed by: INTERNAL MEDICINE

## 2020-12-07 PROCEDURE — 99999 PR PBB SHADOW E&M-EST. PATIENT-LVL IV: CPT | Mod: PBBFAC,,, | Performed by: INTERNAL MEDICINE

## 2020-12-07 PROCEDURE — 3008F BODY MASS INDEX DOCD: CPT | Mod: CPTII,S$GLB,, | Performed by: INTERNAL MEDICINE

## 2020-12-07 PROCEDURE — 3008F PR BODY MASS INDEX (BMI) DOCUMENTED: ICD-10-PCS | Mod: CPTII,S$GLB,, | Performed by: INTERNAL MEDICINE

## 2020-12-07 RX ORDER — LEVOTHYROXINE SODIUM 75 UG/1
TABLET ORAL
Qty: 45 TABLET | Refills: 3 | Status: SHIPPED | OUTPATIENT
Start: 2020-12-07 | End: 2021-10-05

## 2020-12-07 RX ORDER — BUSPIRONE HYDROCHLORIDE 7.5 MG/1
TABLET ORAL
Qty: 30 TABLET | Refills: 1 | Status: SHIPPED | OUTPATIENT
Start: 2020-12-07 | End: 2021-10-26

## 2020-12-07 RX ORDER — TOPIRAMATE 50 MG/1
50 TABLET, FILM COATED ORAL EVERY 12 HOURS
COMMUNITY
End: 2023-10-26 | Stop reason: SDUPTHER

## 2020-12-07 RX ORDER — METOPROLOL SUCCINATE 25 MG/1
25 TABLET, EXTENDED RELEASE ORAL 2 TIMES DAILY
Qty: 180 TABLET | Refills: 1 | Status: SHIPPED | OUTPATIENT
Start: 2020-12-07 | End: 2021-07-12

## 2020-12-07 NOTE — PATIENT INSTRUCTIONS
Uncontrolled hypertension: start monitoring BP at home; keep a log for office review; given log paper to use; increase metoprolol succinate to 25 mg 2x a day; presently 25 mg in am, w 12.5 mg in pm. Cont amlodipine 5 mg a morning. Limit salt intake as well; nursing visit in 7-10 days for BP check. Add DASH diet.   -     metoprolol succinate (TOPROL-XL) 25 MG 24 hr tablet; Take 1 tablet (25 mg total) by mouth 2 (two) times daily.  Dispense: 180 tablet; Refill: 1  -     Lipid Panel; Future; Expected date: 12/07/2020  -     Comprehensive Metabolic Panel; Future; Expected date: 12/07/2020  -     Hemoglobin A1C; Future; Expected date: 12/07/2020    Generalized anxiety disorder: Limit caffeine intake with stress reduction and regular exercise as tolerated. Neurology Dr ARTHUR Aquino just started topamax for positional headaches; should also help BP and anxiety. Have increased buspar to 7.5 mg 3x a day as needed for anxiety; feels could use higher dosing.   -     busPIRone (BUSPAR) 7.5 MG tablet; 7.5 mg po TID as needed for anxiety.  Dispense: 30 tablet; Refill: 1    Panic attacks; increasing metoprolol to 25 mg 2x a day; no palpitations w metoprolol.   -     busPIRone (BUSPAR) 7.5 MG tablet; 7.5 mg po TID as needed for anxiety.  Dispense: 30 tablet; Refill: 1    Heart palpitations: improved; have cleared. Limit caffeine.   -     busPIRone (BUSPAR) 7.5 MG tablet; 7.5 mg po TID as needed for anxiety.  Dispense: 30 tablet; Refill: 1    Mixed hyperlipidemia: Maintain low fat high fiber diet, exercise regularly. Weight reduction where indicated. Limit dairy products and adhere to diet a lot better. Exercise once BP improved.   -     Lipid Panel; Future; Expected date: 12/07/2020  -     Comprehensive Metabolic Panel; Future; Expected date: 12/07/2020    Dyslipidemia: as above  -     Lipid Panel; Future; Expected date: 12/07/2020  -     Comprehensive Metabolic Panel; Future; Expected date: 12/07/2020    Postoperative  hypothyroidism  -     levothyroxine (SYNTHROID) 75 MCG tablet; 1/2 of 75 mcg po daily. Take w 150 mcg a day dose of levothyroxine. .  Dispense: 45 tablet; Refill: 3    Class 2 severe obesity due to excess calories with serious comorbidity and body mass index (BMI) of 39.0 to 39.9 in adult; Caloric restriction w regular exercise and weight reduction. Exercise once BP improved. To <140/90  -     levothyroxine (SYNTHROID) 75 MCG tablet; 1/2 of 75 mcg po daily. Take w 150 mcg a day dose of levothyroxine. .  Dispense: 45 tablet; Refill: 3  -     Comprehensive Metabolic Panel; Future; Expected date: 12/07/2020  -     Hemoglobin A1C; Future; Expected date: 12/07/2020    Impaired fasting blood sugar: Exercise recommended with weight reduction and low carb diet; we'll follow hemoglobin A1c's with you periodically.  -     Comprehensive Metabolic Panel; Future; Expected date: 12/07/2020  -     Hemoglobin A1C; Future; Expected date: 12/07/2020    Gastroesophageal reflux disease, unspecified whether esophagitis present: No bedtime snacks; weight reduction. protonix 40 mg a day generic as needed.

## 2020-12-07 NOTE — PROGRESS NOTES
Subjective:       Patient ID: Kimberly Jolley is a 60 y.o. female.    Chief Complaint: No chief complaint on file.    HPI here today for reassessment and go over her labs.     Hypertension:  Blood pressure here initially 186/98 with pulse 78 follow-up 202/96.  Patient needs to check her blood pressure at home and keep a log and remain seated 3-4 minutes before taking it.  Claims she keeps real busy read having squirrels at her house.  Increase metoprolol from 25 mg 1 in the morning and half in late afternoon to 1 p.o. b.i.d..  Continue amlodipine 5 mg per day.     Generalized anxiety disorder with history of panic attacks.  No palpitations since starting metoprolol.  Topamax was added by Neurology for postural headaches.  Patient is only been on the Topamax for 2 days now; added for postural headaches; should really help her anxiety as well; will also increase BuSpar to 7.5 mg t.i.d. p.r.n..     Headaches:  Neurology suspects a possibility for spinal leak.  MRI with head and myelogram by neurology Dr. Pato Aquino.  Lying flat her headache clears present with sitting up.  MRA of the brain scheduled for Wednesday to assess for possible spinal leak; patient's sister with an aneurysm behind her eye.  The stress with this may be easily increasing her anxiety     Hypothyroidism:  Takes her levothyroxine appropriately TSH has gone from 5.08-2.4.  Free T4 is 1.12, free T3 is 2.5.  On levothyroxine 150 mcg with half of 75 mcg tablet as well daily     Impaired fasting blood sugar:  Watches her carbohydrates; knows importance of exercise; A1c is 5.8 in fasting blood sugars 110.     GERD:  No belching; uses pantoprazole 40 mg per day as needed.     Obesity:  BMI 39.05; stage II; knows the importance of regular exercise and small portions to help her weight come down as well as a low-fat low-salt diet     Total time:  8:26 a.m. next a.m. till 9:56 a.m. greater than 50% of time spent in discussion, counseling, and review.   Additional 15 min spent on supplemental documentation and review afterwards.    Review of Systems   Constitutional: Negative for appetite change and fever.   HENT: Negative for congestion, postnasal drip, rhinorrhea and sinus pressure.    Eyes: Negative for discharge and itching.   Respiratory: Negative for cough, chest tightness, shortness of breath and wheezing.    Cardiovascular: Negative for chest pain, palpitations and leg swelling.   Gastrointestinal: Negative for abdominal distention, abdominal pain, blood in stool, constipation, diarrhea, nausea and vomiting.   Endocrine: Negative for polydipsia, polyphagia and polyuria.   Genitourinary: Negative for dysuria and hematuria.   Musculoskeletal: Negative for arthralgias and myalgias.   Skin: Negative for rash.   Allergic/Immunologic: Negative for environmental allergies and food allergies.   Neurological: Negative for tremors, seizures and syncope.   Hematological: Negative for adenopathy. Does not bruise/bleed easily.   Psychiatric/Behavioral: Negative for dysphoric mood. The patient is not nervous/anxious.         Request buspar increase; good and bad days. Topamax just added by neurology.        Objective:      Vitals:    12/07/20 0837 12/07/20 0840   BP: (!) 202/96 (!) 186/98   BP Location: Right arm Left arm   Patient Position: Sitting Sitting   BP Method: Large (Manual) Large (Manual)   Pulse: 78    Temp: 97.5 °F (36.4 °C)    TempSrc: Temporal    SpO2: 99%    Weight: 90.7 kg (199 lb 15.3 oz)    Height: 5' (1.524 m)      Body mass index is 39.05 kg/m².  Wt Readings from Last 3 Encounters:   12/07/20 90.7 kg (199 lb 15.3 oz)   09/30/20 88.1 kg (194 lb 3.6 oz)   08/19/20 91.2 kg (200 lb 15.2 oz)        Physical Exam  Vitals signs reviewed.   Constitutional:       Appearance: She is well-developed.   HENT:      Head: Normocephalic and atraumatic.   Neck:      Musculoskeletal: Normal range of motion and neck supple.      Thyroid: No thyromegaly.      Vascular:  No carotid bruit.   Cardiovascular:      Rate and Rhythm: Normal rate and regular rhythm.      Heart sounds: Normal heart sounds. No murmur. No gallop.    Pulmonary:      Effort: Pulmonary effort is normal. No respiratory distress.      Breath sounds: Normal breath sounds. No wheezing or rales.   Abdominal:      General: Bowel sounds are normal. There is no distension.      Palpations: Abdomen is soft.      Tenderness: There is no abdominal tenderness. There is no guarding or rebound.   Musculoskeletal: Normal range of motion.      Right lower leg: No edema.      Left lower leg: No edema.   Lymphadenopathy:      Cervical: No cervical adenopathy.   Skin:     Findings: No rash.   Neurological:      Mental Status: She is alert and oriented to person, place, and time.      Comments: Moves all 4 extremities fine.   Psychiatric:         Behavior: Behavior normal.         Thought Content: Thought content normal.         Assessment:       1. Uncontrolled hypertension    2. Generalized anxiety disorder    3. Panic attacks    4. Heart palpitations    5. Mixed hyperlipidemia    6. Dyslipidemia    7. Postoperative hypothyroidism    8. Class 2 severe obesity due to excess calories with serious comorbidity and body mass index (BMI) of 39.0 to 39.9 in adult    9. Impaired fasting blood sugar    10. Gastroesophageal reflux disease, unspecified whether esophagitis present        Plan:       Uncontrolled hypertension: start monitoring BP at home; keep a log for office review; given log paper to use; increase metoprolol succinate to 25 mg 2x a day; presently 25 mg in am, w 12.5 mg in pm. Cont amlodipine 5 mg a morning. Limit salt intake as well; nursing visit in 7-10 days for BP check. Add DASH diet.   -     metoprolol succinate (TOPROL-XL) 25 MG 24 hr tablet; Take 1 tablet (25 mg total) by mouth 2 (two) times daily.  Dispense: 180 tablet; Refill: 1  -     Lipid Panel; Future; Expected date: 12/07/2020  -     Comprehensive Metabolic  Panel; Future; Expected date: 12/07/2020  -     Hemoglobin A1C; Future; Expected date: 12/07/2020    Generalized anxiety disorder: Limit caffeine intake with stress reduction and regular exercise as tolerated. Neurology Dr ARTHUR Aquino just started topamax for positional headaches; should also help BP and anxiety. Have increased buspar to 7.5 mg 3x a day as needed for anxiety; feels could use higher dosing.   -     busPIRone (BUSPAR) 7.5 MG tablet; 7.5 mg po TID as needed for anxiety.  Dispense: 30 tablet; Refill: 1    Panic attacks; increasing metoprolol to 25 mg 2x a day; no palpitations w metoprolol.   -     busPIRone (BUSPAR) 7.5 MG tablet; 7.5 mg po TID as needed for anxiety.  Dispense: 30 tablet; Refill: 1    Heart palpitations: improved; have cleared. Limit caffeine.   -     busPIRone (BUSPAR) 7.5 MG tablet; 7.5 mg po TID as needed for anxiety.  Dispense: 30 tablet; Refill: 1    Mixed hyperlipidemia: Maintain low fat high fiber diet, exercise regularly. Weight reduction where indicated. Limit dairy products and adhere to diet a lot better. Exercise once BP improved.   -     Lipid Panel; Future; Expected date: 12/07/2020  -     Comprehensive Metabolic Panel; Future; Expected date: 12/07/2020    Dyslipidemia: as above  -     Lipid Panel; Future; Expected date: 12/07/2020  -     Comprehensive Metabolic Panel; Future; Expected date: 12/07/2020    Postoperative hypothyroidism: same levothyroxine dosing; also on 150 mcg and 1/2 of 75 mcg a day of both.  -     levothyroxine (SYNTHROID) 75 MCG tablet; 1/2 of 75 mcg po daily. Take w 150 mcg a day dose of levothyroxine. .  Dispense: 45 tablet; Refill: 3    Class 2 severe obesity due to excess calories with serious comorbidity and body mass index (BMI) of 39.0 to 39.9 in adult; Caloric restriction w regular exercise and weight reduction. Exercise once BP improved. To <140/90  -     levothyroxine (SYNTHROID) 75 MCG tablet; 1/2 of 75 mcg po daily. Take w 150 mcg a day dose of  levothyroxine. .  Dispense: 45 tablet; Refill: 3  -     Comprehensive Metabolic Panel; Future; Expected date: 12/07/2020  -     Hemoglobin A1C; Future; Expected date: 12/07/2020    Impaired fasting blood sugar: Exercise recommended with weight reduction and low carb diet; we'll follow hemoglobin A1c's with you periodically.  -     Comprehensive Metabolic Panel; Future; Expected date: 12/07/2020  -     Hemoglobin A1C; Future; Expected date: 12/07/2020    Gastroesophageal reflux disease, unspecified whether esophagitis present: No bedtime snacks; weight reduction. protonix 40 mg a day generic as needed.

## 2020-12-07 NOTE — PROGRESS NOTES
Patient declined scheduling a nurse visit for a blood pressure recheck. Patient says she has a blood pressure cuff at home, and will monitor it herself. Also patient has not taken her medication for her blood pressure this morning.

## 2020-12-21 ENCOUNTER — CLINICAL SUPPORT (OUTPATIENT)
Dept: FAMILY MEDICINE | Facility: CLINIC | Age: 60
End: 2020-12-21
Payer: COMMERCIAL

## 2020-12-21 VITALS — SYSTOLIC BLOOD PRESSURE: 136 MMHG | DIASTOLIC BLOOD PRESSURE: 88 MMHG

## 2020-12-21 DIAGNOSIS — Z01.30 BP CHECK: Primary | ICD-10-CM

## 2020-12-21 PROCEDURE — 99499 UNLISTED E&M SERVICE: CPT | Mod: S$GLB,,, | Performed by: INTERNAL MEDICINE

## 2020-12-21 PROCEDURE — 99499 NO LOS: ICD-10-PCS | Mod: S$GLB,,, | Performed by: INTERNAL MEDICINE

## 2020-12-21 PROCEDURE — 99999 PR PBB SHADOW E&M-EST. PATIENT-LVL I: ICD-10-PCS | Mod: PBBFAC,,,

## 2020-12-21 PROCEDURE — 99999 PR PBB SHADOW E&M-EST. PATIENT-LVL I: CPT | Mod: PBBFAC,,,

## 2020-12-21 NOTE — PROGRESS NOTES
BP RA (manual) 136/88  Pt home manual BP cuff is medium size. Instructed pt that she needs a large cuff for accurate readings. Pt  Verbalized understanding. Gave extra BP log for pt to continue tracking her BP.    Dr. Seo notified.    Kimberly Jolley 60 y.o. female is here today for Blood Pressure check.   History of HTN no.    Review of patient's allergies indicates:   Allergen Reactions    Asa [aspirin]     Nsaids (non-steroidal anti-inflammatory drug)      Creatinine   Date Value Ref Range Status   11/30/2020 0.8 0.5 - 1.4 mg/dL Final     Sodium   Date Value Ref Range Status   11/30/2020 142 136 - 145 mmol/L Final     Potassium   Date Value Ref Range Status   11/30/2020 4.2 3.5 - 5.1 mmol/L Final   ]  Patient verifies taking blood pressure medications on a regular basis at the same time of the day.     Current Outpatient Medications:     acetaminophen (TYLENOL EXTRA STRENGTH) 500 MG tablet, Take 1,000 mg by mouth 3 (three) times daily., Disp: , Rfl:     amLODIPine (NORVASC) 5 MG tablet, Take 1 tablet (5 mg total) by mouth once daily., Disp: 90 tablet, Rfl: 3    busPIRone (BUSPAR) 7.5 MG tablet, 7.5 mg po TID as needed for anxiety., Disp: 30 tablet, Rfl: 1    gabapentin (NEURONTIN) 300 MG capsule, Take by mouth. 1 tablet in the am and 2 tablets at night, Disp: , Rfl: 5    ibuprofen (ADVIL,MOTRIN) 200 MG tablet, Take 200 mg by mouth As instructed for Pain. Pt takes 3 tablets bid, Disp: , Rfl:     levothyroxine (SYNTHROID) 150 MCG tablet, Take 1 tablet (150 mcg total) by mouth before breakfast., Disp: 90 tablet, Rfl: 1    levothyroxine (SYNTHROID) 75 MCG tablet, 1/2 of 75 mcg po daily. Take w 150 mcg a day dose of levothyroxine. ., Disp: 45 tablet, Rfl: 3    methocarbamol (ROBAXIN) 750 MG Tab, Take 1,500 mg by mouth 3 (three) times daily. , Disp: , Rfl: 0    metoprolol succinate (TOPROL-XL) 25 MG 24 hr tablet, Take 1 tablet (25 mg total) by mouth 2 (two) times daily., Disp: 180 tablet, Rfl: 1     oxyCODONE (ROXICODONE) 10 mg Tab immediate release tablet, Take 10 mg by mouth 3 (three) times daily., Disp: , Rfl:     pantoprazole (PROTONIX) 40 MG tablet, Take 1 tablet (40 mg total) by mouth once daily., Disp: 30 tablet, Rfl: 2    tiZANidine (ZANAFLEX) 4 MG tablet, Take 1 tablet (4 mg total) by mouth every 6 (six) hours as needed., Disp: 15 tablet, Rfl: 0    topiramate (TOPAMAX) 50 MG tablet, Take 25 mg by mouth 2 (two) times daily. 25mg am, 25 mg pm, Disp: , Rfl:   Does patient have record of home blood pressure readings yes. Readings have been averaging 150s/80s.   Last dose of blood pressure medication was taken at 12/21/20 @ 8:30AM.  Patient is asymptomatic.   Complains of NA.

## 2020-12-21 NOTE — Clinical Note
See notes and Vitals for BP check. Pt home log in box for review. Pt home BP cuff is medium size and she was instructed she needs large cuff.

## 2020-12-23 ENCOUNTER — HOSPITAL ENCOUNTER (OUTPATIENT)
Dept: RADIOLOGY | Facility: HOSPITAL | Age: 60
Discharge: HOME | End: 2020-12-23
Attending: INTERNAL MEDICINE
Payer: COMMERCIAL

## 2020-12-23 DIAGNOSIS — R93.2 ABNORMAL LIVER ULTRASOUND: ICD-10-CM

## 2020-12-23 DIAGNOSIS — N28.1 RENAL CYST, LEFT: ICD-10-CM

## 2020-12-23 PROCEDURE — 76700 US ABDOMEN COMPLETE: ICD-10-PCS | Mod: 26,,, | Performed by: RADIOLOGY

## 2020-12-23 PROCEDURE — 76700 US EXAM ABDOM COMPLETE: CPT | Mod: 26,,, | Performed by: RADIOLOGY

## 2020-12-23 PROCEDURE — 76700 US EXAM ABDOM COMPLETE: CPT | Mod: TC,PO

## 2021-01-04 ENCOUNTER — PATIENT MESSAGE (OUTPATIENT)
Dept: ADMINISTRATIVE | Facility: HOSPITAL | Age: 61
End: 2021-01-04

## 2021-01-07 ENCOUNTER — DOCUMENTATION ONLY (OUTPATIENT)
Dept: ADMINISTRATIVE | Facility: HOSPITAL | Age: 61
End: 2021-01-07

## 2021-01-08 ENCOUNTER — TELEPHONE (OUTPATIENT)
Dept: FAMILY MEDICINE | Facility: CLINIC | Age: 61
End: 2021-01-08

## 2021-01-25 ENCOUNTER — HOSPITAL ENCOUNTER (OUTPATIENT)
Dept: RADIOLOGY | Facility: HOSPITAL | Age: 61
Discharge: HOME | End: 2021-01-25
Attending: INTERNAL MEDICINE
Payer: COMMERCIAL

## 2021-01-25 DIAGNOSIS — Z12.31 SCREENING MAMMOGRAM, ENCOUNTER FOR: ICD-10-CM

## 2021-01-25 PROCEDURE — 77067 SCR MAMMO BI INCL CAD: CPT | Mod: TC,PO

## 2021-01-25 PROCEDURE — 77067 MAMMO DIGITAL SCREENING BILAT WITH TOMO: ICD-10-PCS | Mod: 26,,, | Performed by: RADIOLOGY

## 2021-01-25 PROCEDURE — 77063 MAMMO DIGITAL SCREENING BILAT WITH TOMO: ICD-10-PCS | Mod: 26,,, | Performed by: RADIOLOGY

## 2021-01-25 PROCEDURE — 77067 SCR MAMMO BI INCL CAD: CPT | Mod: 26,,, | Performed by: RADIOLOGY

## 2021-01-25 PROCEDURE — 77063 BREAST TOMOSYNTHESIS BI: CPT | Mod: 26,,, | Performed by: RADIOLOGY

## 2021-02-04 NOTE — TELEPHONE ENCOUNTER
----- Message from Ranjith Larson sent at 4/12/2018  4:35 PM CDT -----  Contact: self   Patient want to speak with a nurse regarding changing orders for blood work to [facility], please call back at [phone]                Statement Selected

## 2021-02-08 ENCOUNTER — LAB VISIT (OUTPATIENT)
Dept: LAB | Facility: HOSPITAL | Age: 61
End: 2021-02-08
Attending: INTERNAL MEDICINE
Payer: COMMERCIAL

## 2021-02-08 DIAGNOSIS — E78.5 DYSLIPIDEMIA: ICD-10-CM

## 2021-02-08 DIAGNOSIS — E78.2 MIXED HYPERLIPIDEMIA: ICD-10-CM

## 2021-02-08 DIAGNOSIS — E66.01 CLASS 2 SEVERE OBESITY DUE TO EXCESS CALORIES WITH SERIOUS COMORBIDITY AND BODY MASS INDEX (BMI) OF 39.0 TO 39.9 IN ADULT: ICD-10-CM

## 2021-02-08 DIAGNOSIS — R73.01 IMPAIRED FASTING BLOOD SUGAR: ICD-10-CM

## 2021-02-08 DIAGNOSIS — I10 UNCONTROLLED HYPERTENSION: ICD-10-CM

## 2021-02-08 LAB
ALBUMIN SERPL BCP-MCNC: 3.9 G/DL (ref 3.5–5.2)
ALP SERPL-CCNC: 76 U/L (ref 55–135)
ALT SERPL W/O P-5'-P-CCNC: 32 U/L (ref 10–44)
ANION GAP SERPL CALC-SCNC: 9 MMOL/L (ref 8–16)
AST SERPL-CCNC: 23 U/L (ref 10–40)
BILIRUB SERPL-MCNC: 0.4 MG/DL (ref 0.1–1)
BUN SERPL-MCNC: 12 MG/DL (ref 6–20)
CALCIUM SERPL-MCNC: 9.3 MG/DL (ref 8.7–10.5)
CHLORIDE SERPL-SCNC: 111 MMOL/L (ref 95–110)
CHOLEST SERPL-MCNC: 177 MG/DL (ref 120–199)
CHOLEST/HDLC SERPL: 3.4 {RATIO} (ref 2–5)
CO2 SERPL-SCNC: 24 MMOL/L (ref 23–29)
CREAT SERPL-MCNC: 0.8 MG/DL (ref 0.5–1.4)
EST. GFR  (AFRICAN AMERICAN): >60 ML/MIN/1.73 M^2
EST. GFR  (NON AFRICAN AMERICAN): >60 ML/MIN/1.73 M^2
ESTIMATED AVG GLUCOSE: 120 MG/DL (ref 68–131)
GLUCOSE SERPL-MCNC: 104 MG/DL (ref 70–110)
HBA1C MFR BLD: 5.8 % (ref 4–5.6)
HDLC SERPL-MCNC: 52 MG/DL (ref 40–75)
HDLC SERPL: 29.4 % (ref 20–50)
LDLC SERPL CALC-MCNC: 90.6 MG/DL (ref 63–159)
NONHDLC SERPL-MCNC: 125 MG/DL
POTASSIUM SERPL-SCNC: 3.8 MMOL/L (ref 3.5–5.1)
PROT SERPL-MCNC: 7 G/DL (ref 6–8.4)
SODIUM SERPL-SCNC: 144 MMOL/L (ref 136–145)
TRIGL SERPL-MCNC: 172 MG/DL (ref 30–150)

## 2021-02-08 PROCEDURE — 80061 LIPID PANEL: CPT

## 2021-02-08 PROCEDURE — 83036 HEMOGLOBIN GLYCOSYLATED A1C: CPT

## 2021-02-08 PROCEDURE — 36415 COLL VENOUS BLD VENIPUNCTURE: CPT | Mod: PN

## 2021-02-08 PROCEDURE — 80053 COMPREHEN METABOLIC PANEL: CPT

## 2021-02-11 ENCOUNTER — LAB VISIT (OUTPATIENT)
Dept: LAB | Facility: HOSPITAL | Age: 61
End: 2021-02-11
Attending: INTERNAL MEDICINE
Payer: COMMERCIAL

## 2021-02-11 DIAGNOSIS — Z12.11 COLON CANCER SCREENING: ICD-10-CM

## 2021-02-11 PROCEDURE — 82274 ASSAY TEST FOR BLOOD FECAL: CPT

## 2021-02-18 LAB — HEMOCCULT STL QL IA: NEGATIVE

## 2021-02-23 ENCOUNTER — OFFICE VISIT (OUTPATIENT)
Dept: FAMILY MEDICINE | Facility: CLINIC | Age: 61
End: 2021-02-23
Payer: COMMERCIAL

## 2021-02-23 VITALS
HEART RATE: 74 BPM | DIASTOLIC BLOOD PRESSURE: 78 MMHG | OXYGEN SATURATION: 98 % | HEIGHT: 60 IN | WEIGHT: 193.44 LBS | BODY MASS INDEX: 37.98 KG/M2 | SYSTOLIC BLOOD PRESSURE: 116 MMHG

## 2021-02-23 DIAGNOSIS — I10 ESSENTIAL HYPERTENSION: Primary | ICD-10-CM

## 2021-02-23 DIAGNOSIS — R73.01 IMPAIRED FASTING BLOOD SUGAR: ICD-10-CM

## 2021-02-23 DIAGNOSIS — F41.0 PANIC ATTACKS: ICD-10-CM

## 2021-02-23 DIAGNOSIS — E03.9 HYPOTHYROIDISM, UNSPECIFIED TYPE: ICD-10-CM

## 2021-02-23 DIAGNOSIS — K76.0 FATTY LIVER: ICD-10-CM

## 2021-02-23 DIAGNOSIS — F41.1 GENERALIZED ANXIETY DISORDER: ICD-10-CM

## 2021-02-23 DIAGNOSIS — E78.1 HYPERTRIGLYCERIDEMIA: ICD-10-CM

## 2021-02-23 DIAGNOSIS — M54.42 CHRONIC MIDLINE LOW BACK PAIN WITH LEFT-SIDED SCIATICA: ICD-10-CM

## 2021-02-23 DIAGNOSIS — R13.19 ESOPHAGEAL DYSPHAGIA: ICD-10-CM

## 2021-02-23 DIAGNOSIS — N28.1 CYST OF LEFT KIDNEY: ICD-10-CM

## 2021-02-23 DIAGNOSIS — Z01.89 ENCOUNTER FOR LABORATORY TEST: ICD-10-CM

## 2021-02-23 DIAGNOSIS — K21.9 GASTROESOPHAGEAL REFLUX DISEASE, UNSPECIFIED WHETHER ESOPHAGITIS PRESENT: ICD-10-CM

## 2021-02-23 DIAGNOSIS — E66.01 CLASS 2 SEVERE OBESITY WITH SERIOUS COMORBIDITY AND BODY MASS INDEX (BMI) OF 37.0 TO 37.9 IN ADULT, UNSPECIFIED OBESITY TYPE: ICD-10-CM

## 2021-02-23 DIAGNOSIS — Z87.442 HISTORY OF KIDNEY STONES: ICD-10-CM

## 2021-02-23 DIAGNOSIS — N23 KIDNEY PAIN: ICD-10-CM

## 2021-02-23 DIAGNOSIS — Z98.890 HISTORY OF ESOPHAGEAL DILATATION: ICD-10-CM

## 2021-02-23 DIAGNOSIS — G89.29 CHRONIC MIDLINE LOW BACK PAIN WITH LEFT-SIDED SCIATICA: ICD-10-CM

## 2021-02-23 PROCEDURE — 3074F SYST BP LT 130 MM HG: CPT | Mod: CPTII,S$GLB,, | Performed by: INTERNAL MEDICINE

## 2021-02-23 PROCEDURE — 3074F PR MOST RECENT SYSTOLIC BLOOD PRESSURE < 130 MM HG: ICD-10-PCS | Mod: CPTII,S$GLB,, | Performed by: INTERNAL MEDICINE

## 2021-02-23 PROCEDURE — 3078F DIAST BP <80 MM HG: CPT | Mod: CPTII,S$GLB,, | Performed by: INTERNAL MEDICINE

## 2021-02-23 PROCEDURE — 3008F PR BODY MASS INDEX (BMI) DOCUMENTED: ICD-10-PCS | Mod: CPTII,S$GLB,, | Performed by: INTERNAL MEDICINE

## 2021-02-23 PROCEDURE — 99215 OFFICE O/P EST HI 40 MIN: CPT | Mod: S$GLB,,, | Performed by: INTERNAL MEDICINE

## 2021-02-23 PROCEDURE — 3078F PR MOST RECENT DIASTOLIC BLOOD PRESSURE < 80 MM HG: ICD-10-PCS | Mod: CPTII,S$GLB,, | Performed by: INTERNAL MEDICINE

## 2021-02-23 PROCEDURE — 3008F BODY MASS INDEX DOCD: CPT | Mod: CPTII,S$GLB,, | Performed by: INTERNAL MEDICINE

## 2021-02-23 PROCEDURE — 99999 PR PBB SHADOW E&M-EST. PATIENT-LVL IV: CPT | Mod: PBBFAC,,, | Performed by: INTERNAL MEDICINE

## 2021-02-23 PROCEDURE — 99999 PR PBB SHADOW E&M-EST. PATIENT-LVL IV: ICD-10-PCS | Mod: PBBFAC,,, | Performed by: INTERNAL MEDICINE

## 2021-02-23 PROCEDURE — 99215 PR OFFICE/OUTPT VISIT, EST, LEVL V, 40-54 MIN: ICD-10-PCS | Mod: S$GLB,,, | Performed by: INTERNAL MEDICINE

## 2021-02-23 RX ORDER — LEVOTHYROXINE SODIUM 150 UG/1
150 TABLET ORAL
Qty: 90 TABLET | Refills: 1 | Status: SHIPPED | OUTPATIENT
Start: 2021-02-23 | End: 2021-08-23

## 2021-02-23 RX ORDER — PANTOPRAZOLE SODIUM 40 MG/1
40 TABLET, DELAYED RELEASE ORAL DAILY
Qty: 90 TABLET | Refills: 1 | Status: SHIPPED | OUTPATIENT
Start: 2021-02-23 | End: 2023-10-26

## 2021-02-25 ENCOUNTER — HOSPITAL ENCOUNTER (OUTPATIENT)
Dept: RADIOLOGY | Facility: HOSPITAL | Age: 61
Discharge: HOME | End: 2021-02-25
Attending: INTERNAL MEDICINE
Payer: COMMERCIAL

## 2021-02-25 DIAGNOSIS — N23 KIDNEY PAIN: ICD-10-CM

## 2021-02-25 DIAGNOSIS — Z87.442 HISTORY OF KIDNEY STONES: ICD-10-CM

## 2021-02-25 PROCEDURE — 74176 CT RENAL STONE STUDY ABD PELVIS WO: ICD-10-PCS | Mod: 26,,, | Performed by: RADIOLOGY

## 2021-02-25 PROCEDURE — 74176 CT ABD & PELVIS W/O CONTRAST: CPT | Mod: 26,,, | Performed by: RADIOLOGY

## 2021-02-25 PROCEDURE — 74176 CT ABD & PELVIS W/O CONTRAST: CPT | Mod: TC,PO

## 2021-03-01 ENCOUNTER — PATIENT MESSAGE (OUTPATIENT)
Dept: FAMILY MEDICINE | Facility: CLINIC | Age: 61
End: 2021-03-01

## 2021-03-12 ENCOUNTER — OFFICE VISIT (OUTPATIENT)
Dept: UROLOGY | Facility: CLINIC | Age: 61
End: 2021-03-12
Payer: COMMERCIAL

## 2021-03-12 ENCOUNTER — PATIENT OUTREACH (OUTPATIENT)
Dept: ADMINISTRATIVE | Facility: OTHER | Age: 61
End: 2021-03-12

## 2021-03-12 VITALS — BODY MASS INDEX: 38 KG/M2 | HEIGHT: 60 IN | WEIGHT: 193.56 LBS

## 2021-03-12 DIAGNOSIS — N28.1 CYST OF LEFT KIDNEY: ICD-10-CM

## 2021-03-12 DIAGNOSIS — N23 KIDNEY PAIN: ICD-10-CM

## 2021-03-12 DIAGNOSIS — Z87.442 HISTORY OF KIDNEY STONES: ICD-10-CM

## 2021-03-12 LAB
BILIRUB SERPL-MCNC: ABNORMAL MG/DL
BLOOD URINE, POC: ABNORMAL
CLARITY, POC UA: CLEAR
COLOR, POC UA: YELLOW
GLUCOSE UR QL STRIP: ABNORMAL
KETONES UR QL STRIP: ABNORMAL
LEUKOCYTE ESTERASE URINE, POC: ABNORMAL
NITRITE, POC UA: ABNORMAL
PH, POC UA: 6
PROTEIN, POC: ABNORMAL
SPECIFIC GRAVITY, POC UA: 1.01
UROBILINOGEN, POC UA: 0.2

## 2021-03-12 PROCEDURE — 81002 URINALYSIS NONAUTO W/O SCOPE: CPT | Mod: S$GLB,,, | Performed by: UROLOGY

## 2021-03-12 PROCEDURE — 99203 PR OFFICE/OUTPT VISIT, NEW, LEVL III, 30-44 MIN: ICD-10-PCS | Mod: 25,S$GLB,, | Performed by: UROLOGY

## 2021-03-12 PROCEDURE — 99203 OFFICE O/P NEW LOW 30 MIN: CPT | Mod: 25,S$GLB,, | Performed by: UROLOGY

## 2021-03-12 PROCEDURE — 99999 PR PBB SHADOW E&M-EST. PATIENT-LVL IV: CPT | Mod: PBBFAC,,, | Performed by: UROLOGY

## 2021-03-12 PROCEDURE — 1126F PR PAIN SEVERITY QUANTIFIED, NO PAIN PRESENT: ICD-10-PCS | Mod: S$GLB,,, | Performed by: UROLOGY

## 2021-03-12 PROCEDURE — 3008F BODY MASS INDEX DOCD: CPT | Mod: CPTII,S$GLB,, | Performed by: UROLOGY

## 2021-03-12 PROCEDURE — 1126F AMNT PAIN NOTED NONE PRSNT: CPT | Mod: S$GLB,,, | Performed by: UROLOGY

## 2021-03-12 PROCEDURE — 81002 POCT URINE DIPSTICK WITHOUT MICROSCOPE: ICD-10-PCS | Mod: S$GLB,,, | Performed by: UROLOGY

## 2021-03-12 PROCEDURE — 3008F PR BODY MASS INDEX (BMI) DOCUMENTED: ICD-10-PCS | Mod: CPTII,S$GLB,, | Performed by: UROLOGY

## 2021-03-12 PROCEDURE — 99999 PR PBB SHADOW E&M-EST. PATIENT-LVL IV: ICD-10-PCS | Mod: PBBFAC,,, | Performed by: UROLOGY

## 2021-03-12 RX ORDER — DIAZEPAM 5 MG/1
TABLET ORAL
COMMUNITY
End: 2021-05-27

## 2021-03-12 RX ORDER — METHYLPREDNISOLONE 4 MG/1
TABLET ORAL
COMMUNITY
Start: 2020-12-22 | End: 2021-05-27

## 2021-03-12 RX ORDER — DICLOFENAC SODIUM 20 MG/G
SOLUTION TOPICAL
COMMUNITY
End: 2021-06-09

## 2021-03-12 RX ORDER — ONDANSETRON 8 MG/1
8 TABLET, ORALLY DISINTEGRATING ORAL 3 TIMES DAILY
COMMUNITY
Start: 2021-03-11

## 2021-03-12 RX ORDER — IBUPROFEN AND FAMOTIDINE 800; 26.6 MG/1; MG/1
TABLET, COATED ORAL
COMMUNITY
End: 2021-06-09

## 2021-03-15 ENCOUNTER — TELEPHONE (OUTPATIENT)
Dept: FAMILY MEDICINE | Facility: CLINIC | Age: 61
End: 2021-03-15

## 2021-03-17 ENCOUNTER — TELEPHONE (OUTPATIENT)
Dept: FAMILY MEDICINE | Facility: CLINIC | Age: 61
End: 2021-03-17

## 2021-03-18 ENCOUNTER — OFFICE VISIT (OUTPATIENT)
Dept: FAMILY MEDICINE | Facility: CLINIC | Age: 61
End: 2021-03-18
Payer: COMMERCIAL

## 2021-03-18 VITALS
SYSTOLIC BLOOD PRESSURE: 164 MMHG | BODY MASS INDEX: 38.46 KG/M2 | HEIGHT: 60 IN | DIASTOLIC BLOOD PRESSURE: 94 MMHG | OXYGEN SATURATION: 100 % | WEIGHT: 195.88 LBS | HEART RATE: 72 BPM

## 2021-03-18 DIAGNOSIS — I10 ESSENTIAL HYPERTENSION: Primary | ICD-10-CM

## 2021-03-18 DIAGNOSIS — K21.9 GASTROESOPHAGEAL REFLUX DISEASE, UNSPECIFIED WHETHER ESOPHAGITIS PRESENT: ICD-10-CM

## 2021-03-18 DIAGNOSIS — F41.1 GENERALIZED ANXIETY DISORDER: ICD-10-CM

## 2021-03-18 DIAGNOSIS — E78.2 MIXED HYPERLIPIDEMIA: ICD-10-CM

## 2021-03-18 DIAGNOSIS — Z98.890 HISTORY OF ESOPHAGEAL DILATATION: ICD-10-CM

## 2021-03-18 DIAGNOSIS — F43.9 SITUATIONAL STRESS: ICD-10-CM

## 2021-03-18 DIAGNOSIS — E66.01 CLASS 2 SEVERE OBESITY WITH SERIOUS COMORBIDITY AND BODY MASS INDEX (BMI) OF 38.0 TO 38.9 IN ADULT, UNSPECIFIED OBESITY TYPE: ICD-10-CM

## 2021-03-18 DIAGNOSIS — E78.5 DYSLIPIDEMIA: ICD-10-CM

## 2021-03-18 DIAGNOSIS — I34.1 MVP (MITRAL VALVE PROLAPSE): ICD-10-CM

## 2021-03-18 DIAGNOSIS — Z83.3 FAMILY HISTORY OF DIABETES MELLITUS (DM): ICD-10-CM

## 2021-03-18 DIAGNOSIS — R73.09 IMPAIRED GLUCOSE METABOLISM: ICD-10-CM

## 2021-03-18 DIAGNOSIS — R00.2 HEART PALPITATIONS: ICD-10-CM

## 2021-03-18 PROCEDURE — 99999 PR PBB SHADOW E&M-EST. PATIENT-LVL V: CPT | Mod: PBBFAC,,, | Performed by: INTERNAL MEDICINE

## 2021-03-18 PROCEDURE — 99214 OFFICE O/P EST MOD 30 MIN: CPT | Mod: S$GLB,,, | Performed by: INTERNAL MEDICINE

## 2021-03-18 PROCEDURE — 3008F BODY MASS INDEX DOCD: CPT | Mod: CPTII,S$GLB,, | Performed by: INTERNAL MEDICINE

## 2021-03-18 PROCEDURE — 3077F PR MOST RECENT SYSTOLIC BLOOD PRESSURE >= 140 MM HG: ICD-10-PCS | Mod: CPTII,S$GLB,, | Performed by: INTERNAL MEDICINE

## 2021-03-18 PROCEDURE — 3080F DIAST BP >= 90 MM HG: CPT | Mod: CPTII,S$GLB,, | Performed by: INTERNAL MEDICINE

## 2021-03-18 PROCEDURE — 99214 PR OFFICE/OUTPT VISIT, EST, LEVL IV, 30-39 MIN: ICD-10-PCS | Mod: S$GLB,,, | Performed by: INTERNAL MEDICINE

## 2021-03-18 PROCEDURE — 3008F PR BODY MASS INDEX (BMI) DOCUMENTED: ICD-10-PCS | Mod: CPTII,S$GLB,, | Performed by: INTERNAL MEDICINE

## 2021-03-18 PROCEDURE — 99999 PR PBB SHADOW E&M-EST. PATIENT-LVL V: ICD-10-PCS | Mod: PBBFAC,,, | Performed by: INTERNAL MEDICINE

## 2021-03-18 PROCEDURE — 3080F PR MOST RECENT DIASTOLIC BLOOD PRESSURE >= 90 MM HG: ICD-10-PCS | Mod: CPTII,S$GLB,, | Performed by: INTERNAL MEDICINE

## 2021-03-18 PROCEDURE — 3077F SYST BP >= 140 MM HG: CPT | Mod: CPTII,S$GLB,, | Performed by: INTERNAL MEDICINE

## 2021-03-18 RX ORDER — ATORVASTATIN CALCIUM 10 MG/1
10 TABLET, FILM COATED ORAL DAILY
Qty: 90 TABLET | Refills: 1 | Status: SHIPPED | OUTPATIENT
Start: 2021-03-18 | End: 2021-09-28 | Stop reason: SDUPTHER

## 2021-04-01 ENCOUNTER — CLINICAL SUPPORT (OUTPATIENT)
Dept: FAMILY MEDICINE | Facility: CLINIC | Age: 61
End: 2021-04-01
Payer: COMMERCIAL

## 2021-04-01 VITALS — SYSTOLIC BLOOD PRESSURE: 124 MMHG | DIASTOLIC BLOOD PRESSURE: 76 MMHG

## 2021-04-01 DIAGNOSIS — Z01.30 BP CHECK: Primary | ICD-10-CM

## 2021-04-01 PROCEDURE — 99999 PR PBB SHADOW E&M-EST. PATIENT-LVL II: CPT | Mod: PBBFAC,,,

## 2021-04-01 PROCEDURE — 99499 UNLISTED E&M SERVICE: CPT | Mod: S$GLB,,, | Performed by: INTERNAL MEDICINE

## 2021-04-01 PROCEDURE — 99499 NO LOS: ICD-10-PCS | Mod: S$GLB,,, | Performed by: INTERNAL MEDICINE

## 2021-04-01 PROCEDURE — 99999 PR PBB SHADOW E&M-EST. PATIENT-LVL II: ICD-10-PCS | Mod: PBBFAC,,,

## 2021-04-29 ENCOUNTER — PATIENT MESSAGE (OUTPATIENT)
Dept: RESEARCH | Facility: HOSPITAL | Age: 61
End: 2021-04-29

## 2021-05-20 ENCOUNTER — LAB VISIT (OUTPATIENT)
Dept: LAB | Facility: HOSPITAL | Age: 61
End: 2021-05-20
Attending: INTERNAL MEDICINE
Payer: COMMERCIAL

## 2021-05-20 DIAGNOSIS — I10 ESSENTIAL HYPERTENSION: ICD-10-CM

## 2021-05-20 DIAGNOSIS — E78.2 MIXED HYPERLIPIDEMIA: ICD-10-CM

## 2021-05-20 DIAGNOSIS — E78.5 DYSLIPIDEMIA: ICD-10-CM

## 2021-05-20 LAB
ALBUMIN SERPL BCP-MCNC: 3.7 G/DL (ref 3.5–5.2)
ALP SERPL-CCNC: 90 U/L (ref 55–135)
ALT SERPL W/O P-5'-P-CCNC: 28 U/L (ref 10–44)
ANION GAP SERPL CALC-SCNC: 7 MMOL/L (ref 8–16)
AST SERPL-CCNC: 21 U/L (ref 10–40)
BILIRUB SERPL-MCNC: 0.4 MG/DL (ref 0.1–1)
BUN SERPL-MCNC: 13 MG/DL (ref 6–20)
CALCIUM SERPL-MCNC: 9.8 MG/DL (ref 8.7–10.5)
CHLORIDE SERPL-SCNC: 109 MMOL/L (ref 95–110)
CHOLEST SERPL-MCNC: 169 MG/DL (ref 120–199)
CHOLEST/HDLC SERPL: 3 {RATIO} (ref 2–5)
CO2 SERPL-SCNC: 27 MMOL/L (ref 23–29)
CREAT SERPL-MCNC: 0.9 MG/DL (ref 0.5–1.4)
EST. GFR  (AFRICAN AMERICAN): >60 ML/MIN/1.73 M^2
EST. GFR  (NON AFRICAN AMERICAN): >60 ML/MIN/1.73 M^2
GLUCOSE SERPL-MCNC: 93 MG/DL (ref 70–110)
HDLC SERPL-MCNC: 57 MG/DL (ref 40–75)
HDLC SERPL: 33.7 % (ref 20–50)
LDLC SERPL CALC-MCNC: 84.2 MG/DL (ref 63–159)
NONHDLC SERPL-MCNC: 112 MG/DL
POTASSIUM SERPL-SCNC: 4.3 MMOL/L (ref 3.5–5.1)
PROT SERPL-MCNC: 7.3 G/DL (ref 6–8.4)
SODIUM SERPL-SCNC: 143 MMOL/L (ref 136–145)
TRIGL SERPL-MCNC: 139 MG/DL (ref 30–150)

## 2021-05-20 PROCEDURE — 80053 COMPREHEN METABOLIC PANEL: CPT | Performed by: INTERNAL MEDICINE

## 2021-05-20 PROCEDURE — 80061 LIPID PANEL: CPT | Performed by: INTERNAL MEDICINE

## 2021-05-20 PROCEDURE — 36415 COLL VENOUS BLD VENIPUNCTURE: CPT | Mod: PO | Performed by: INTERNAL MEDICINE

## 2021-05-27 ENCOUNTER — OFFICE VISIT (OUTPATIENT)
Dept: FAMILY MEDICINE | Facility: CLINIC | Age: 61
End: 2021-05-27
Payer: COMMERCIAL

## 2021-05-27 VITALS
OXYGEN SATURATION: 98 % | HEART RATE: 60 BPM | BODY MASS INDEX: 38.39 KG/M2 | WEIGHT: 195.56 LBS | SYSTOLIC BLOOD PRESSURE: 134 MMHG | DIASTOLIC BLOOD PRESSURE: 80 MMHG | HEIGHT: 60 IN

## 2021-05-27 DIAGNOSIS — I10 ESSENTIAL HYPERTENSION: Primary | ICD-10-CM

## 2021-05-27 DIAGNOSIS — F41.1 GENERALIZED ANXIETY DISORDER: ICD-10-CM

## 2021-05-27 DIAGNOSIS — E78.5 DYSLIPIDEMIA: ICD-10-CM

## 2021-05-27 DIAGNOSIS — R73.09 IMPAIRED GLUCOSE METABOLISM: ICD-10-CM

## 2021-05-27 DIAGNOSIS — E89.0 POSTOPERATIVE HYPOTHYROIDISM: ICD-10-CM

## 2021-05-27 DIAGNOSIS — Z01.89 ENCOUNTER FOR LABORATORY TEST: ICD-10-CM

## 2021-05-27 DIAGNOSIS — E78.2 MIXED HYPERLIPIDEMIA: ICD-10-CM

## 2021-05-27 DIAGNOSIS — E66.01 CLASS 2 SEVERE OBESITY WITH SERIOUS COMORBIDITY AND BODY MASS INDEX (BMI) OF 38.0 TO 38.9 IN ADULT, UNSPECIFIED OBESITY TYPE: ICD-10-CM

## 2021-05-27 DIAGNOSIS — K21.9 GASTROESOPHAGEAL REFLUX DISEASE, UNSPECIFIED WHETHER ESOPHAGITIS PRESENT: ICD-10-CM

## 2021-05-27 PROCEDURE — 3079F DIAST BP 80-89 MM HG: CPT | Mod: CPTII,S$GLB,, | Performed by: INTERNAL MEDICINE

## 2021-05-27 PROCEDURE — 3008F PR BODY MASS INDEX (BMI) DOCUMENTED: ICD-10-PCS | Mod: CPTII,S$GLB,, | Performed by: INTERNAL MEDICINE

## 2021-05-27 PROCEDURE — 3008F BODY MASS INDEX DOCD: CPT | Mod: CPTII,S$GLB,, | Performed by: INTERNAL MEDICINE

## 2021-05-27 PROCEDURE — 99999 PR PBB SHADOW E&M-EST. PATIENT-LVL III: CPT | Mod: PBBFAC,,, | Performed by: INTERNAL MEDICINE

## 2021-05-27 PROCEDURE — 3075F SYST BP GE 130 - 139MM HG: CPT | Mod: CPTII,S$GLB,, | Performed by: INTERNAL MEDICINE

## 2021-05-27 PROCEDURE — 99999 PR PBB SHADOW E&M-EST. PATIENT-LVL III: ICD-10-PCS | Mod: PBBFAC,,, | Performed by: INTERNAL MEDICINE

## 2021-05-27 PROCEDURE — 3079F PR MOST RECENT DIASTOLIC BLOOD PRESSURE 80-89 MM HG: ICD-10-PCS | Mod: CPTII,S$GLB,, | Performed by: INTERNAL MEDICINE

## 2021-05-27 PROCEDURE — 3075F PR MOST RECENT SYSTOLIC BLOOD PRESS GE 130-139MM HG: ICD-10-PCS | Mod: CPTII,S$GLB,, | Performed by: INTERNAL MEDICINE

## 2021-05-27 PROCEDURE — 99214 PR OFFICE/OUTPT VISIT, EST, LEVL IV, 30-39 MIN: ICD-10-PCS | Mod: S$GLB,,, | Performed by: INTERNAL MEDICINE

## 2021-05-27 PROCEDURE — 99214 OFFICE O/P EST MOD 30 MIN: CPT | Mod: S$GLB,,, | Performed by: INTERNAL MEDICINE

## 2021-07-09 DIAGNOSIS — I10 UNCONTROLLED HYPERTENSION: ICD-10-CM

## 2021-07-12 RX ORDER — METOPROLOL SUCCINATE 25 MG/1
TABLET, EXTENDED RELEASE ORAL
Qty: 180 TABLET | Refills: 1 | Status: SHIPPED | OUTPATIENT
Start: 2021-07-12 | End: 2021-10-26 | Stop reason: SDUPTHER

## 2021-08-20 DIAGNOSIS — E03.9 HYPOTHYROIDISM, UNSPECIFIED TYPE: ICD-10-CM

## 2021-08-23 RX ORDER — LEVOTHYROXINE SODIUM 150 UG/1
TABLET ORAL
Qty: 90 TABLET | Refills: 3 | Status: SHIPPED | OUTPATIENT
Start: 2021-08-23 | End: 2021-10-05

## 2021-08-24 ENCOUNTER — TELEPHONE (OUTPATIENT)
Dept: FAMILY MEDICINE | Facility: CLINIC | Age: 61
End: 2021-08-24

## 2021-08-24 ENCOUNTER — LAB VISIT (OUTPATIENT)
Dept: LAB | Facility: HOSPITAL | Age: 61
End: 2021-08-24
Attending: INTERNAL MEDICINE
Payer: COMMERCIAL

## 2021-08-24 DIAGNOSIS — Z01.84 ENCOUNTER FOR ANTIBODY RESPONSE EXAMINATION: ICD-10-CM

## 2021-08-24 DIAGNOSIS — I10 ESSENTIAL HYPERTENSION: ICD-10-CM

## 2021-08-24 DIAGNOSIS — R73.09 IMPAIRED GLUCOSE METABOLISM: ICD-10-CM

## 2021-08-24 DIAGNOSIS — E66.01 CLASS 2 SEVERE OBESITY WITH SERIOUS COMORBIDITY AND BODY MASS INDEX (BMI) OF 38.0 TO 38.9 IN ADULT, UNSPECIFIED OBESITY TYPE: ICD-10-CM

## 2021-08-24 DIAGNOSIS — E89.0 POSTOPERATIVE HYPOTHYROIDISM: ICD-10-CM

## 2021-08-24 LAB
ANION GAP SERPL CALC-SCNC: 8 MMOL/L (ref 8–16)
BUN SERPL-MCNC: 11 MG/DL (ref 8–23)
CALCIUM SERPL-MCNC: 10.1 MG/DL (ref 8.7–10.5)
CHLORIDE SERPL-SCNC: 107 MMOL/L (ref 95–110)
CO2 SERPL-SCNC: 25 MMOL/L (ref 23–29)
CREAT SERPL-MCNC: 0.9 MG/DL (ref 0.5–1.4)
EST. GFR  (AFRICAN AMERICAN): >60 ML/MIN/1.73 M^2
EST. GFR  (NON AFRICAN AMERICAN): >60 ML/MIN/1.73 M^2
ESTIMATED AVG GLUCOSE: 114 MG/DL (ref 68–131)
GLUCOSE SERPL-MCNC: 96 MG/DL (ref 70–110)
HBA1C MFR BLD: 5.6 % (ref 4–5.6)
POTASSIUM SERPL-SCNC: 4.3 MMOL/L (ref 3.5–5.1)
SARS-COV-2 IGG SERPL IA-ACNC: <50 AU/ML
SARS-COV-2 IGG SERPL QL IA: NEGATIVE
SODIUM SERPL-SCNC: 140 MMOL/L (ref 136–145)
T3FREE SERPL-MCNC: 2.7 PG/ML (ref 2.3–4.2)
T4 FREE SERPL-MCNC: 1.13 NG/DL (ref 0.71–1.51)
TSH SERPL DL<=0.005 MIU/L-ACNC: 0.06 UIU/ML (ref 0.4–4)

## 2021-08-24 PROCEDURE — 83036 HEMOGLOBIN GLYCOSYLATED A1C: CPT | Performed by: INTERNAL MEDICINE

## 2021-08-24 PROCEDURE — 86769 SARS-COV-2 COVID-19 ANTIBODY: CPT | Performed by: INTERNAL MEDICINE

## 2021-08-24 PROCEDURE — 84481 FREE ASSAY (FT-3): CPT | Performed by: INTERNAL MEDICINE

## 2021-08-24 PROCEDURE — 36415 COLL VENOUS BLD VENIPUNCTURE: CPT | Mod: PO | Performed by: INTERNAL MEDICINE

## 2021-08-24 PROCEDURE — 84443 ASSAY THYROID STIM HORMONE: CPT | Performed by: INTERNAL MEDICINE

## 2021-08-24 PROCEDURE — 80048 BASIC METABOLIC PNL TOTAL CA: CPT | Performed by: INTERNAL MEDICINE

## 2021-08-24 PROCEDURE — 84439 ASSAY OF FREE THYROXINE: CPT | Performed by: INTERNAL MEDICINE

## 2021-09-28 DIAGNOSIS — E78.2 MIXED HYPERLIPIDEMIA: ICD-10-CM

## 2021-09-28 DIAGNOSIS — E78.5 DYSLIPIDEMIA: ICD-10-CM

## 2021-09-30 RX ORDER — ATORVASTATIN CALCIUM 10 MG/1
10 TABLET, FILM COATED ORAL DAILY
Qty: 90 TABLET | Refills: 1 | Status: SHIPPED | OUTPATIENT
Start: 2021-09-30 | End: 2022-03-07

## 2021-10-05 ENCOUNTER — PATIENT MESSAGE (OUTPATIENT)
Dept: FAMILY MEDICINE | Facility: CLINIC | Age: 61
End: 2021-10-05

## 2021-10-05 ENCOUNTER — TELEPHONE (OUTPATIENT)
Dept: FAMILY MEDICINE | Facility: CLINIC | Age: 61
End: 2021-10-05

## 2021-10-05 DIAGNOSIS — E89.0 POSTOPERATIVE HYPOTHYROIDISM: Primary | ICD-10-CM

## 2021-10-05 RX ORDER — LEVOTHYROXINE SODIUM 175 UG/1
175 TABLET ORAL
Qty: 90 TABLET | Refills: 1 | Status: SHIPPED | OUTPATIENT
Start: 2021-10-05 | End: 2022-03-14

## 2021-10-06 ENCOUNTER — TELEPHONE (OUTPATIENT)
Dept: FAMILY MEDICINE | Facility: CLINIC | Age: 61
End: 2021-10-06

## 2021-10-18 ENCOUNTER — LAB VISIT (OUTPATIENT)
Dept: LAB | Facility: HOSPITAL | Age: 61
End: 2021-10-18
Attending: INTERNAL MEDICINE
Payer: COMMERCIAL

## 2021-10-18 ENCOUNTER — OFFICE VISIT (OUTPATIENT)
Dept: FAMILY MEDICINE | Facility: CLINIC | Age: 61
End: 2021-10-18
Payer: COMMERCIAL

## 2021-10-18 VITALS
WEIGHT: 193.56 LBS | BODY MASS INDEX: 38 KG/M2 | SYSTOLIC BLOOD PRESSURE: 166 MMHG | DIASTOLIC BLOOD PRESSURE: 84 MMHG | OXYGEN SATURATION: 100 % | HEART RATE: 74 BPM | HEIGHT: 60 IN

## 2021-10-18 DIAGNOSIS — R35.1 NOCTURIA: ICD-10-CM

## 2021-10-18 DIAGNOSIS — E78.2 MIXED HYPERLIPIDEMIA: ICD-10-CM

## 2021-10-18 DIAGNOSIS — F41.9 ANXIETY: ICD-10-CM

## 2021-10-18 DIAGNOSIS — R53.83 FATIGUE, UNSPECIFIED TYPE: ICD-10-CM

## 2021-10-18 DIAGNOSIS — G47.9 SLEEP DISORDER: ICD-10-CM

## 2021-10-18 DIAGNOSIS — E03.9 HYPOTHYROIDISM, UNSPECIFIED TYPE: ICD-10-CM

## 2021-10-18 DIAGNOSIS — I10 PRIMARY HYPERTENSION: Primary | ICD-10-CM

## 2021-10-18 DIAGNOSIS — E78.5 DYSLIPIDEMIA: ICD-10-CM

## 2021-10-18 LAB
BACTERIA #/AREA URNS AUTO: NORMAL /HPF
BILIRUB UR QL STRIP: NEGATIVE
CLARITY UR REFRACT.AUTO: CLEAR
COLOR UR AUTO: NORMAL
GLUCOSE UR QL STRIP: NEGATIVE
HGB UR QL STRIP: NEGATIVE
KETONES UR QL STRIP: NEGATIVE
LEUKOCYTE ESTERASE UR QL STRIP: NEGATIVE
MICROSCOPIC COMMENT: NORMAL
NITRITE UR QL STRIP: NEGATIVE
PH UR STRIP: 7 [PH] (ref 5–8)
PROT UR QL STRIP: NEGATIVE
SP GR UR STRIP: 1.01 (ref 1–1.03)
URN SPEC COLLECT METH UR: NORMAL

## 2021-10-18 PROCEDURE — 1160F PR REVIEW ALL MEDS BY PRESCRIBER/CLIN PHARMACIST DOCUMENTED: ICD-10-PCS | Mod: CPTII,S$GLB,, | Performed by: INTERNAL MEDICINE

## 2021-10-18 PROCEDURE — 99999 PR PBB SHADOW E&M-EST. PATIENT-LVL V: ICD-10-PCS | Mod: PBBFAC,,, | Performed by: INTERNAL MEDICINE

## 2021-10-18 PROCEDURE — 3044F HG A1C LEVEL LT 7.0%: CPT | Mod: CPTII,S$GLB,, | Performed by: INTERNAL MEDICINE

## 2021-10-18 PROCEDURE — 3077F PR MOST RECENT SYSTOLIC BLOOD PRESSURE >= 140 MM HG: ICD-10-PCS | Mod: CPTII,S$GLB,, | Performed by: INTERNAL MEDICINE

## 2021-10-18 PROCEDURE — 99215 PR OFFICE/OUTPT VISIT, EST, LEVL V, 40-54 MIN: ICD-10-PCS | Mod: S$GLB,,, | Performed by: INTERNAL MEDICINE

## 2021-10-18 PROCEDURE — 3079F PR MOST RECENT DIASTOLIC BLOOD PRESSURE 80-89 MM HG: ICD-10-PCS | Mod: CPTII,S$GLB,, | Performed by: INTERNAL MEDICINE

## 2021-10-18 PROCEDURE — 99999 PR PBB SHADOW E&M-EST. PATIENT-LVL V: CPT | Mod: PBBFAC,,, | Performed by: INTERNAL MEDICINE

## 2021-10-18 PROCEDURE — 3008F BODY MASS INDEX DOCD: CPT | Mod: CPTII,S$GLB,, | Performed by: INTERNAL MEDICINE

## 2021-10-18 PROCEDURE — 99215 OFFICE O/P EST HI 40 MIN: CPT | Mod: S$GLB,,, | Performed by: INTERNAL MEDICINE

## 2021-10-18 PROCEDURE — 81001 URINALYSIS AUTO W/SCOPE: CPT | Performed by: INTERNAL MEDICINE

## 2021-10-18 PROCEDURE — 3077F SYST BP >= 140 MM HG: CPT | Mod: CPTII,S$GLB,, | Performed by: INTERNAL MEDICINE

## 2021-10-18 PROCEDURE — 3044F PR MOST RECENT HEMOGLOBIN A1C LEVEL <7.0%: ICD-10-PCS | Mod: CPTII,S$GLB,, | Performed by: INTERNAL MEDICINE

## 2021-10-18 PROCEDURE — 1159F MED LIST DOCD IN RCRD: CPT | Mod: CPTII,S$GLB,, | Performed by: INTERNAL MEDICINE

## 2021-10-18 PROCEDURE — 1160F RVW MEDS BY RX/DR IN RCRD: CPT | Mod: CPTII,S$GLB,, | Performed by: INTERNAL MEDICINE

## 2021-10-18 PROCEDURE — 3079F DIAST BP 80-89 MM HG: CPT | Mod: CPTII,S$GLB,, | Performed by: INTERNAL MEDICINE

## 2021-10-18 PROCEDURE — 3008F PR BODY MASS INDEX (BMI) DOCUMENTED: ICD-10-PCS | Mod: CPTII,S$GLB,, | Performed by: INTERNAL MEDICINE

## 2021-10-18 PROCEDURE — 1159F PR MEDICATION LIST DOCUMENTED IN MEDICAL RECORD: ICD-10-PCS | Mod: CPTII,S$GLB,, | Performed by: INTERNAL MEDICINE

## 2021-10-18 RX ORDER — TRAZODONE HYDROCHLORIDE 50 MG/1
50 TABLET ORAL NIGHTLY PRN
Qty: 30 TABLET | Refills: 2 | Status: SHIPPED | OUTPATIENT
Start: 2021-10-18 | End: 2023-09-22

## 2021-10-26 ENCOUNTER — TELEPHONE (OUTPATIENT)
Dept: FAMILY MEDICINE | Facility: CLINIC | Age: 61
End: 2021-10-26
Payer: COMMERCIAL

## 2021-10-26 DIAGNOSIS — R53.83 FATIGUE, UNSPECIFIED TYPE: ICD-10-CM

## 2021-10-26 DIAGNOSIS — I10 UNCONTROLLED HYPERTENSION: ICD-10-CM

## 2021-10-26 DIAGNOSIS — I10 PRIMARY HYPERTENSION: Primary | ICD-10-CM

## 2021-10-26 RX ORDER — METOPROLOL SUCCINATE 25 MG/1
25 TABLET, EXTENDED RELEASE ORAL 2 TIMES DAILY
Qty: 180 TABLET | Refills: 1 | Status: SHIPPED | OUTPATIENT
Start: 2021-10-26 | End: 2022-03-24

## 2021-11-18 DIAGNOSIS — I10 UNCONTROLLED HYPERTENSION: ICD-10-CM

## 2021-11-18 DIAGNOSIS — F41.0 PANIC ATTACKS: ICD-10-CM

## 2021-11-18 DIAGNOSIS — F41.1 GENERALIZED ANXIETY DISORDER: ICD-10-CM

## 2021-11-20 RX ORDER — AMLODIPINE BESYLATE 5 MG/1
5 TABLET ORAL DAILY
Qty: 90 TABLET | Refills: 1 | Status: ON HOLD | OUTPATIENT
Start: 2021-11-20 | End: 2022-05-04

## 2021-11-26 ENCOUNTER — OFFICE VISIT (OUTPATIENT)
Dept: FAMILY MEDICINE | Facility: CLINIC | Age: 61
End: 2021-11-26
Payer: COMMERCIAL

## 2021-11-26 VITALS
BODY MASS INDEX: 38.39 KG/M2 | HEART RATE: 64 BPM | SYSTOLIC BLOOD PRESSURE: 120 MMHG | HEIGHT: 60 IN | WEIGHT: 195.56 LBS | DIASTOLIC BLOOD PRESSURE: 84 MMHG

## 2021-11-26 DIAGNOSIS — Z87.39 HISTORY OF OSTEOPENIA: ICD-10-CM

## 2021-11-26 DIAGNOSIS — Z12.11 COLON CANCER SCREENING: ICD-10-CM

## 2021-11-26 DIAGNOSIS — R73.01 IMPAIRED FASTING BLOOD SUGAR: ICD-10-CM

## 2021-11-26 DIAGNOSIS — G89.4 CHRONIC PAIN SYNDROME: ICD-10-CM

## 2021-11-26 DIAGNOSIS — I10 ESSENTIAL HYPERTENSION: ICD-10-CM

## 2021-11-26 DIAGNOSIS — E66.01 CLASS 2 SEVERE OBESITY WITH SERIOUS COMORBIDITY AND BODY MASS INDEX (BMI) OF 38.0 TO 38.9 IN ADULT, UNSPECIFIED OBESITY TYPE: ICD-10-CM

## 2021-11-26 DIAGNOSIS — Z00.00 ANNUAL PHYSICAL EXAM: Primary | ICD-10-CM

## 2021-11-26 DIAGNOSIS — Z23 INFLUENZA VACCINE NEEDED: ICD-10-CM

## 2021-11-26 DIAGNOSIS — E89.0 POSTOPERATIVE HYPOTHYROIDISM: ICD-10-CM

## 2021-11-26 DIAGNOSIS — K76.0 FATTY LIVER: ICD-10-CM

## 2021-11-26 DIAGNOSIS — Z12.31 BREAST CANCER SCREENING BY MAMMOGRAM: ICD-10-CM

## 2021-11-26 DIAGNOSIS — Z98.890 HISTORY OF ESOPHAGEAL DILATATION: ICD-10-CM

## 2021-11-26 DIAGNOSIS — R13.19 ESOPHAGEAL DYSPHAGIA: ICD-10-CM

## 2021-11-26 DIAGNOSIS — K21.9 GASTROESOPHAGEAL REFLUX DISEASE, UNSPECIFIED WHETHER ESOPHAGITIS PRESENT: ICD-10-CM

## 2021-11-26 DIAGNOSIS — R74.01 TRANSAMINITIS: ICD-10-CM

## 2021-11-26 DIAGNOSIS — N28.1 RENAL CYST, LEFT: ICD-10-CM

## 2021-11-26 DIAGNOSIS — Z71.85 VACCINE COUNSELING: ICD-10-CM

## 2021-11-26 DIAGNOSIS — Z00.00 HEALTHCARE MAINTENANCE: ICD-10-CM

## 2021-11-26 DIAGNOSIS — F41.9 ANXIETY: ICD-10-CM

## 2021-11-26 PROCEDURE — 90471 IMMUNIZATION ADMIN: CPT | Mod: S$GLB,,, | Performed by: INTERNAL MEDICINE

## 2021-11-26 PROCEDURE — 99999 PR PBB SHADOW E&M-EST. PATIENT-LVL V: CPT | Mod: PBBFAC,,, | Performed by: INTERNAL MEDICINE

## 2021-11-26 PROCEDURE — 99999 PR PBB SHADOW E&M-EST. PATIENT-LVL V: ICD-10-PCS | Mod: PBBFAC,,, | Performed by: INTERNAL MEDICINE

## 2021-11-26 PROCEDURE — 99396 PR PREVENTIVE VISIT,EST,40-64: ICD-10-PCS | Mod: 25,S$GLB,, | Performed by: INTERNAL MEDICINE

## 2021-11-26 PROCEDURE — 90471 FLU VACCINE (QUAD) GREATER THAN OR EQUAL TO 3YO PRESERVATIVE FREE IM: ICD-10-PCS | Mod: S$GLB,,, | Performed by: INTERNAL MEDICINE

## 2021-11-26 PROCEDURE — 90686 IIV4 VACC NO PRSV 0.5 ML IM: CPT | Mod: S$GLB,,, | Performed by: INTERNAL MEDICINE

## 2021-11-26 PROCEDURE — 90686 FLU VACCINE (QUAD) GREATER THAN OR EQUAL TO 3YO PRESERVATIVE FREE IM: ICD-10-PCS | Mod: S$GLB,,, | Performed by: INTERNAL MEDICINE

## 2021-11-26 PROCEDURE — 99396 PREV VISIT EST AGE 40-64: CPT | Mod: 25,S$GLB,, | Performed by: INTERNAL MEDICINE

## 2021-11-26 RX ORDER — BUSPIRONE HYDROCHLORIDE 7.5 MG/1
7.5 TABLET ORAL 3 TIMES DAILY PRN
COMMUNITY
Start: 2021-10-26 | End: 2023-09-22

## 2021-11-29 ENCOUNTER — PATIENT OUTREACH (OUTPATIENT)
Dept: ADMINISTRATIVE | Facility: HOSPITAL | Age: 61
End: 2021-11-29
Payer: COMMERCIAL

## 2021-12-14 ENCOUNTER — TELEPHONE (OUTPATIENT)
Dept: FAMILY MEDICINE | Facility: CLINIC | Age: 61
End: 2021-12-14
Payer: COMMERCIAL

## 2021-12-14 DIAGNOSIS — R11.0 NAUSEA: ICD-10-CM

## 2021-12-14 DIAGNOSIS — R19.7 DIARRHEA: ICD-10-CM

## 2021-12-15 ENCOUNTER — LAB VISIT (OUTPATIENT)
Dept: FAMILY MEDICINE | Facility: CLINIC | Age: 61
End: 2021-12-15
Payer: COMMERCIAL

## 2021-12-15 DIAGNOSIS — R19.7 DIARRHEA: ICD-10-CM

## 2021-12-15 DIAGNOSIS — R11.0 NAUSEA: ICD-10-CM

## 2021-12-15 PROCEDURE — U0003 INFECTIOUS AGENT DETECTION BY NUCLEIC ACID (DNA OR RNA); SEVERE ACUTE RESPIRATORY SYNDROME CORONAVIRUS 2 (SARS-COV-2) (CORONAVIRUS DISEASE [COVID-19]), AMPLIFIED PROBE TECHNIQUE, MAKING USE OF HIGH THROUGHPUT TECHNOLOGIES AS DESCRIBED BY CMS-2020-01-R: HCPCS | Performed by: INTERNAL MEDICINE

## 2021-12-15 PROCEDURE — U0005 INFEC AGEN DETEC AMPLI PROBE: HCPCS | Performed by: INTERNAL MEDICINE

## 2021-12-16 LAB
SARS-COV-2 RNA RESP QL NAA+PROBE: NOT DETECTED
SARS-COV-2- CYCLE NUMBER: NORMAL

## 2022-01-14 ENCOUNTER — LAB VISIT (OUTPATIENT)
Dept: PRIMARY CARE CLINIC | Facility: OTHER | Age: 62
End: 2022-01-14
Payer: COMMERCIAL

## 2022-01-14 DIAGNOSIS — Z20.822 ENCOUNTER FOR LABORATORY TESTING FOR COVID-19 VIRUS: ICD-10-CM

## 2022-01-14 PROCEDURE — U0003 INFECTIOUS AGENT DETECTION BY NUCLEIC ACID (DNA OR RNA); SEVERE ACUTE RESPIRATORY SYNDROME CORONAVIRUS 2 (SARS-COV-2) (CORONAVIRUS DISEASE [COVID-19]), AMPLIFIED PROBE TECHNIQUE, MAKING USE OF HIGH THROUGHPUT TECHNOLOGIES AS DESCRIBED BY CMS-2020-01-R: HCPCS | Performed by: FAMILY MEDICINE

## 2022-01-15 DIAGNOSIS — U07.1 COVID-19 VIRUS DETECTED: ICD-10-CM

## 2022-01-15 LAB
SARS-COV-2 RNA RESP QL NAA+PROBE: DETECTED
SARS-COV-2- CYCLE NUMBER: 5

## 2022-02-10 ENCOUNTER — PATIENT OUTREACH (OUTPATIENT)
Dept: ADMINISTRATIVE | Facility: OTHER | Age: 62
End: 2022-02-10
Payer: COMMERCIAL

## 2022-02-10 NOTE — PROGRESS NOTES
Health Maintenance Due   Topic Date Due    HIV Screening  Never done    Mammogram  01/25/2022     Updates were requested from care everywhere.  Chart was reviewed for overdue Proactive [facility] Encounters ([facility]) topics (CRS, Breast Cancer Screening, Eye exam)  Health Maintenance has been updated.  [facility] immunization registry triggered.  Immunizations were reconciled.'

## 2022-02-14 NOTE — PROGRESS NOTES
"Subjective:       Patient ID: Kimberly Jolley is a 61 y.o. female.    Chief Complaint: No chief complaint on file.    This is my 1st encounter with this pleasant 61-year-old female, who presents for evaluation of her swallowing difficulties.  See Dr. Calix office note below.  The last time this happened was about 10 or 12 years ago, or around the late .  She saw Dr. Almonte, and had dilations.  She feels like her pills hang up near her vocal cords.  Even drinking a full glass of water behind it to help it pass.  Several weeks ago, around 3:30 in the morning, she had an episode of regurgitation. It tasted and/or felt like solid grease."   Just straight Grease, (grease and liquid), but no food.  She takes Protonix, but not every day, but "off and on."  She has also been gastroscoped in the distant past, even back in 8th grade, and she recalls that the stomach had innumerable tiny ulcers.  Her last colonoscopy was also in the early , again with Dr. Almonte.  She has had a history of a , and he commented that it formed a lot of scar tissue.  She had her gallbladder removed in her 20s ( and she mentions that it was a calculus cholecystitis).      See Dr. Seo's last OV note, 2021:  GERD:  On Protonix as needed knows not to take any bedtime snacks.  Esophageal dysphagia/History of esophageal dilatation; last was years ago; has required several times.  Performed prior by  who is retired.  Last was 10-12 years ago.  Will consult GI Dr. Connelly as patient has been noted solid intermittent dysphagia with things sticking in her upper esophagus lately.  Patient in need of EGD and colonoscopy.  Colon cancer screening:  Last total colonoscopy was in the ; no polyps have been noted for patient in the past.  Needs colonoscopy updated.    Review of Systems   Constitutional: Negative for activity change, appetite change, fatigue, fever and unexpected weight change.   HENT: Positive for " trouble swallowing (Especially pill dysphagia.). Negative for dental problem, drooling, mouth sores, sore throat and voice change.    Eyes: Negative.    Respiratory: Negative for cough, choking, shortness of breath, wheezing and stridor.    Cardiovascular: Negative for chest pain.   Gastrointestinal: Positive for reflux (Reflux and regurgitation.). Negative for abdominal distention, abdominal pain, anal bleeding, blood in stool, constipation, diarrhea, nausea, rectal pain and vomiting.   Genitourinary: Negative.    Musculoskeletal: Negative for arthralgias, joint swelling, myalgias and neck stiffness.   Integumentary:  Negative for color change and rash.   Neurological: Negative for weakness.   Hematological: Negative for adenopathy. Does not bruise/bleed easily.   Psychiatric/Behavioral: Negative for agitation, behavioral problems, confusion, decreased concentration and dysphoric mood.         Objective:      Physical Exam  Constitutional:       General: She is not in acute distress.     Appearance: Normal appearance. She is well-developed. She is obese.   HENT:      Head: Normocephalic.      Nose: Nose normal.      Mouth/Throat:      Mouth: Mucous membranes are moist.      Pharynx: No oropharyngeal exudate.   Eyes:      General: No scleral icterus.     Conjunctiva/sclera: Conjunctivae normal.   Neck:      Thyroid: No thyromegaly.      Trachea: No tracheal deviation.   Cardiovascular:      Rate and Rhythm: Normal rate and regular rhythm.      Heart sounds: Normal heart sounds. No murmur heard.    No gallop.   Pulmonary:      Effort: Pulmonary effort is normal.      Breath sounds: Normal breath sounds. No wheezing or rales.   Abdominal:      General: Bowel sounds are normal. There is no distension.      Palpations: Abdomen is soft. There is no mass.      Tenderness: There is no abdominal tenderness. There is no guarding.   Genitourinary:     Rectum: Guaiac result negative.   Musculoskeletal:         General: Normal  range of motion.      Cervical back: Neck supple.   Lymphadenopathy:      Cervical: No cervical adenopathy.   Skin:     General: Skin is warm and dry.      Findings: No erythema or rash.   Neurological:      General: No focal deficit present.      Mental Status: She is alert and oriented to person, place, and time.   Psychiatric:         Mood and Affect: Mood normal.         Behavior: Behavior normal.         Thought Content: Thought content normal.         Assessment:         Gastroesophageal reflux disease, unspecified whether esophagitis present    Esophageal dysphagia  -     Ambulatory referral/consult to Gastroenterology    History of esophageal dilatation  -     Ambulatory referral/consult to Gastroenterology    Regurgitation of stomach contents    Colon cancer screening  -     Ambulatory referral/consult to Gastroenterology    Class 2 severe obesity due to excess calories with serious comorbidity and body mass index (BMI) of 37.0 to 37.9 in adult      Plan:        1.  Schedule for EGD and colon.   2. Take the Protonix, on a daily basis.   3.   May, perhaps, need a gastric emptying study.   4. And, of course, needs weight loss diet.

## 2022-02-15 ENCOUNTER — OFFICE VISIT (OUTPATIENT)
Dept: GASTROENTEROLOGY | Facility: CLINIC | Age: 62
End: 2022-02-15
Payer: COMMERCIAL

## 2022-02-15 VITALS — HEIGHT: 60 IN | WEIGHT: 194 LBS | BODY MASS INDEX: 38.09 KG/M2

## 2022-02-15 DIAGNOSIS — K21.9 GASTROESOPHAGEAL REFLUX DISEASE, UNSPECIFIED WHETHER ESOPHAGITIS PRESENT: Primary | ICD-10-CM

## 2022-02-15 DIAGNOSIS — Z98.890 HISTORY OF ESOPHAGEAL DILATATION: ICD-10-CM

## 2022-02-15 DIAGNOSIS — R11.10 REGURGITATION OF STOMACH CONTENTS: ICD-10-CM

## 2022-02-15 DIAGNOSIS — Z12.11 COLON CANCER SCREENING: ICD-10-CM

## 2022-02-15 DIAGNOSIS — E66.01 CLASS 2 SEVERE OBESITY DUE TO EXCESS CALORIES WITH SERIOUS COMORBIDITY AND BODY MASS INDEX (BMI) OF 37.0 TO 37.9 IN ADULT: ICD-10-CM

## 2022-02-15 DIAGNOSIS — R13.19 ESOPHAGEAL DYSPHAGIA: ICD-10-CM

## 2022-02-15 DIAGNOSIS — Z01.818 PRE-OP TESTING: ICD-10-CM

## 2022-02-15 PROCEDURE — 99999 PR PBB SHADOW E&M-EST. PATIENT-LVL IV: CPT | Mod: PBBFAC,,, | Performed by: INTERNAL MEDICINE

## 2022-02-15 PROCEDURE — 1160F RVW MEDS BY RX/DR IN RCRD: CPT | Mod: CPTII,S$GLB,, | Performed by: INTERNAL MEDICINE

## 2022-02-15 PROCEDURE — 1159F MED LIST DOCD IN RCRD: CPT | Mod: CPTII,S$GLB,, | Performed by: INTERNAL MEDICINE

## 2022-02-15 PROCEDURE — 99203 OFFICE O/P NEW LOW 30 MIN: CPT | Mod: S$GLB,,, | Performed by: INTERNAL MEDICINE

## 2022-02-15 PROCEDURE — 99203 PR OFFICE/OUTPT VISIT, NEW, LEVL III, 30-44 MIN: ICD-10-PCS | Mod: S$GLB,,, | Performed by: INTERNAL MEDICINE

## 2022-02-15 PROCEDURE — 3008F PR BODY MASS INDEX (BMI) DOCUMENTED: ICD-10-PCS | Mod: CPTII,S$GLB,, | Performed by: INTERNAL MEDICINE

## 2022-02-15 PROCEDURE — 99999 PR PBB SHADOW E&M-EST. PATIENT-LVL IV: ICD-10-PCS | Mod: PBBFAC,,, | Performed by: INTERNAL MEDICINE

## 2022-02-15 PROCEDURE — 1160F PR REVIEW ALL MEDS BY PRESCRIBER/CLIN PHARMACIST DOCUMENTED: ICD-10-PCS | Mod: CPTII,S$GLB,, | Performed by: INTERNAL MEDICINE

## 2022-02-15 PROCEDURE — 1159F PR MEDICATION LIST DOCUMENTED IN MEDICAL RECORD: ICD-10-PCS | Mod: CPTII,S$GLB,, | Performed by: INTERNAL MEDICINE

## 2022-02-15 PROCEDURE — 3008F BODY MASS INDEX DOCD: CPT | Mod: CPTII,S$GLB,, | Performed by: INTERNAL MEDICINE

## 2022-02-28 DIAGNOSIS — E78.5 DYSLIPIDEMIA: ICD-10-CM

## 2022-02-28 DIAGNOSIS — E78.2 MIXED HYPERLIPIDEMIA: ICD-10-CM

## 2022-02-28 NOTE — TELEPHONE ENCOUNTER
Care Due:                  Date            Visit Type   Department     Provider  --------------------------------------------------------------------------------                                EP -                              PRIMARY      [facility] FAM[facility]  Last Visit: 11-      CARE ([facility])   MEDICINE       Benji Seo  Next Visit: None Scheduled  None         None Found                                                            Last  Test          Frequency    Reason                     Performed    Due Date  --------------------------------------------------------------------------------    Lipid Panel.  12 months..  atorvastatin.............  05-   05-    Powered by [email or web address]. Reference number: [id number].   2/28/2022 10:50:57 AM CST

## 2022-03-07 DIAGNOSIS — E89.0 POSTOPERATIVE HYPOTHYROIDISM: ICD-10-CM

## 2022-03-07 RX ORDER — ATORVASTATIN CALCIUM 10 MG/1
10 TABLET, FILM COATED ORAL DAILY
Qty: 90 TABLET | Refills: 1 | Status: SHIPPED | OUTPATIENT
Start: 2022-03-07 | End: 2022-08-11

## 2022-03-07 NOTE — TELEPHONE ENCOUNTER
No new care gaps identified.  Powered by [employer]. Reference number: [id number].   3/07/2022 10:17:25 AM CST

## 2022-03-14 DIAGNOSIS — I10 UNCONTROLLED HYPERTENSION: ICD-10-CM

## 2022-03-14 RX ORDER — LEVOTHYROXINE SODIUM 175 UG/1
TABLET ORAL
Qty: 90 TABLET | Refills: 1 | Status: SHIPPED | OUTPATIENT
Start: 2022-03-14 | End: 2022-08-11

## 2022-03-14 NOTE — TELEPHONE ENCOUNTER
This Rx Request does not qualify for assessment with the [facility]   Please review protocol details and the Care Due Message for extra clinical information    Reasons Rx Request may be deferred:  1. Labs/Vitals overdue  2. Labs/Vitals abnormal  3. Patient has been seen in the ED/Hospital since the last PCP visit  4. Medication is non-delegated  5. Medication associated diagnosis code is outside of scope  6. Provider is a non-participating provider  7. Visit criteria not met (Patient needs to be seen at least every 15 months by PCP)    Note composed:5:47 PM 03/14/2022

## 2022-03-14 NOTE — TELEPHONE ENCOUNTER
No new care gaps identified.  Powered by [employer]. Reference number: [id number].   3/14/2022 9:59:25 AM CDT

## 2022-03-22 NOTE — TELEPHONE ENCOUNTER
This Rx Request does not qualify for assessment with the [facility]   Please review protocol details and the Care Due Message for extra clinical information    Reasons Rx Request may be deferred:  1. Labs/Vitals overdue  2. Labs/Vitals abnormal  3. Patient has been seen in the ED/Hospital since the last PCP visit  4. Medication is non-delegated  5. Medication associated diagnosis code is outside of scope  6. Provider is a non-participating provider  7. Visit criteria not met (Patient needs to be seen at least every 15 months by PCP)    Note composed:1:13 PM 03/22/2022

## 2022-03-24 RX ORDER — METOPROLOL SUCCINATE 25 MG/1
TABLET, EXTENDED RELEASE ORAL
Qty: 180 TABLET | Refills: 1 | Status: SHIPPED | OUTPATIENT
Start: 2022-03-24 | End: 2022-08-27

## 2022-04-30 DIAGNOSIS — F41.0 PANIC ATTACKS: ICD-10-CM

## 2022-04-30 DIAGNOSIS — I10 UNCONTROLLED HYPERTENSION: ICD-10-CM

## 2022-04-30 DIAGNOSIS — F41.1 GENERALIZED ANXIETY DISORDER: ICD-10-CM

## 2022-04-30 NOTE — TELEPHONE ENCOUNTER
Care Due:                  Date            Visit Type   Department     Provider  --------------------------------------------------------------------------------                                EP -                              PRIMARY      [facility] FAMILY  Last Visit: 11-      CARE ([facility])   MEDICINE       Benji Seo  Next Visit: None Scheduled  None         None Found                                                            Last  Test          Frequency    Reason                     Performed    Due Date  --------------------------------------------------------------------------------    Lipid Panel.  12 months..  atorvastatin.............  05-   05-    Powered by [employer]. Reference number: [id number].   4/30/2022 11:50:30 AM CDT

## 2022-05-02 NOTE — TELEPHONE ENCOUNTER
Refill Routing Note   Medication(s) are not appropriate for processing by [facility] for the following reason(s):      - Patient has been seen in the ED/Hospital since the last PCP visit    [facility] action(s):  Route Medication-related problems identified: Requires labs        Medication reconciliation completed: No     Appointments  past 12m or future 3m with PCP    Date Provider   Last Visit   11/26/2021 Benji Seo MD   Next Visit   Visit date not found Benji Seo MD   ED visits in past 90 days: 0        Note composed:9:51 AM 05/02/2022

## 2022-05-03 NOTE — H&P
"History & Physical - Short Stay  Gastroenterology      SUBJECTIVE:     Procedure: Gastroscopy and Colonoscopy    Chief Complaint/Indication for Procedure:  GERD.  Dysphagia.  Colon cancer screening.    History of Present Illness:  See recent GI OV note:  Office Visit   2/15/2022  [place] - Gastroenterology     Emil Connelly Jr., MD    Gastroenterology  Gastroesophageal reflux disease, unspecified whether esophagitis present +5 more    Dx  Establish Care  Emesis ; Referred by Benji Seo MD    Reason for Visit     Progress Notes  Emil Connelly Jr., MD (Physician)   Gastroenterology  Subjective:       Patient ID: Kimberly Jolley is a 61 y.o. female.     Chief Complaint: No chief complaint on file.     This is my 1st encounter with this pleasant 61-year-old female, who presents for evaluation of her swallowing difficulties.  See Dr. Calix office note below.  The last time this happened was about 10 or 12 years ago, or around the late s.  She saw Dr. Almonte, and had dilations.  She feels like her pills hang up near her vocal cords.  Even drinking a full glass of water behind it to help it pass.  Several weeks ago, around 3:30 in the morning, she had an episode of regurgitation. It tasted and/or felt like solid grease."   Just straight Grease, (grease and liquid), but no food.  She takes Protonix, but not every day, but "off and on."  She has also been gastroscoped in the distant past, even back in 8th grade, and she recalls that the stomach had innumerable tiny ulcers.  Her last colonoscopy was also in the early s, again with Dr. Almonte.  She has had a history of a , and he commented that it formed a lot of scar tissue.  She had her gallbladder removed in her 20s ( and she mentions that it was a calculus cholecystitis).        See Dr. Seo's last OV note, 2021:  GERD:  On Protonix as needed knows not to take any bedtime snacks.  Esophageal dysphagia/History of esophageal " dilatation; last was years ago; has required several times.  Performed prior by  who is retired.  Last was 10-12 years ago.  Will consult GI Dr. Connelly as patient has been noted solid intermittent dysphagia with things sticking in her upper esophagus lately.  Patient in need of EGD and colonoscopy.  Colon cancer screening:  Last total colonoscopy was in the 2000s; no polyps have been noted for patient in the past.  Needs colonoscopy updated.     Review of Systems   Constitutional: Negative for activity change, appetite change, fatigue, fever and unexpected weight change.   HENT: Positive for trouble swallowing (Especially pill dysphagia.). Negative for dental problem, drooling, mouth sores, sore throat and voice change.    Eyes: Negative.    Respiratory: Negative for cough, choking, shortness of breath, wheezing and stridor.    Cardiovascular: Negative for chest pain.   Gastrointestinal: Positive for reflux (Reflux and regurgitation.). Negative for abdominal distention, abdominal pain, anal bleeding, blood in stool, constipation, diarrhea, nausea, rectal pain and vomiting.     Assessment:         Gastroesophageal reflux disease, unspecified whether esophagitis present     Esophageal dysphagia  -     Ambulatory referral/consult to Gastroenterology     History of esophageal dilatation  -     Ambulatory referral/consult to Gastroenterology     Regurgitation of stomach contents     Colon cancer screening  -     Ambulatory referral/consult to Gastroenterology     Class 2 severe obesity due to excess calories with serious comorbidity and body mass index (BMI) of 37.0 to 37.9 in adult        Plan:        1.  Schedule for EGD and colon.   2. Take the Protonix, on a daily basis.   3.   May, perhaps, need a gastric emptying study.   4. And, of course, needs weight loss diet.                 Wt Readings from Last 20 Encounters:   05/02/22 88 kg (194 lb)   02/15/22 88 kg (194 lb 0.1 oz)   12/01/21 88.5 kg (195 lb)    11/26/21 88.7 kg (195 lb 8.8 oz)   10/18/21 87.8 kg (193 lb 9 oz)   05/27/21 88.7 kg (195 lb 8.8 oz)   03/18/21 88.9 kg (195 lb 14.1 oz)   03/12/21 87.8 kg (193 lb 9 oz)   02/23/21 87.8 kg (193 lb 7.3 oz)   12/18/20 90.6 kg (199 lb 11.8 oz)   12/07/20 90.7 kg (199 lb 15.3 oz)   09/30/20 88.1 kg (194 lb 3.6 oz)   08/19/20 91.2 kg (200 lb 15.2 oz)   04/24/18 95.2 kg (209 lb 15.8 oz)   03/28/18 93.8 kg (206 lb 12.7 oz)   03/20/18 92 kg (202 lb 14.9 oz)   03/01/18 92.8 kg (204 lb 9.4 oz)   02/21/18 91.8 kg (202 lb 6.1 oz)   05/21/16 87.5 kg (192 lb 14.4 oz)         PTA Medications   Medication Sig    amLODIPine (NORVASC) 5 MG tablet Take 1 tablet (5 mg total) by mouth once daily.    atorvastatin (LIPITOR) 10 MG tablet TAKE 1 TABLET (10 MG TOTAL) BY MOUTH ONCE DAILY.    gabapentin (NEURONTIN) 300 MG capsule Take by mouth. 1 tablet in the am and 2 tablets at night    levothyroxine (SYNTHROID, LEVOTHROID) 175 MCG tablet TAKE ONE TABLET BY MOUTH BEFORE BREAKFAST **DISCONTINUE ALL OTHER LEVOTHYROXINE STRENGTHS**    methocarbamol (ROBAXIN) 750 MG Tab Take 1,500 mg by mouth 3 (three) times daily.     metoprolol succinate (TOPROL-XL) 25 MG 24 hr tablet TAKE 1 TABLET (25 MG TOTAL) BY MOUTH 2 (TWO) TIMES A DAY **INCREASED FREQUENCY**    oxyCODONE (ROXICODONE) 10 mg Tab immediate release tablet Take 10 mg by mouth 3 (three) times daily.    topiramate (TOPAMAX) 50 MG tablet Take 50 mg by mouth every 12 (twelve) hours.     acetaminophen (TYLENOL) 500 MG tablet Take 1,000 mg by mouth 3 (three) times daily.    busPIRone (BUSPAR) 7.5 MG tablet Take 7.5 mg by mouth 3 (three) times daily as needed.    ibuprofen (ADVIL,MOTRIN) 200 MG tablet Take 200 mg by mouth As instructed for Pain. Pt takes 3 tablets bid    ondansetron (ZOFRAN-ODT) 8 MG TbDL Take 8 mg by mouth 3 (three) times daily.    pantoprazole (PROTONIX) 40 MG tablet Take 1 tablet (40 mg total) by mouth once daily.    traZODone (DESYREL) 50 MG tablet Take 1 tablet  (50 mg total) by mouth nightly as needed for Insomnia.       Review of patient's allergies indicates:   Allergen Reactions    Lexapro [escitalopram]      Made her feel aggressive; not her self; so stopped    Zoloft [sertraline]      Made her feel aggressive. So stopped; not her self on it.     Asa [aspirin]     Nsaids (non-steroidal anti-inflammatory drug)      Celebrex, prescription strength        Past Medical History:   Diagnosis Date    Anxiety     Dysphagia 2018    Thyroid disease      Past Surgical History:   Procedure Laterality Date    ABDOMINAL SURGERY       SECTION      CHOLECYSTECTOMY      FOOT SURGERY Left     KNEE ARTHROSCOPY Left     LUMBAR SPINE SURGERY      SHOULDER ARTHROSCOPY Right     THYROID SURGERY       Family History   Problem Relation Age of Onset    Arthritis Mother     Miscarriages / Stillbirths Mother         stillborn    Breast cancer Mother     Diabetes Father         DM2    Hyperlipidemia Father     Hypertension Father     Kidney disease Father         CKD4    Vision loss Father         cataract surg    Cancer Sister         breast cancer    Breast cancer Sister     Arthritis Maternal Grandmother     Stroke Maternal Grandfather     Cancer Paternal Grandmother         lung cancer    COPD Paternal Grandmother         emphysema    Vision loss Paternal Grandmother         cataract surgery    Early death Paternal Grandfather         MI at 55-57    Heart disease Paternal Grandfather         MI at 55-57    Hyperlipidemia Paternal Grandfather     Hypertension Paternal Grandfather      Social History     Tobacco Use    Smoking status: Never Smoker    Smokeless tobacco: Never Used   Substance Use Topics    Alcohol use: No    Drug use: No         OBJECTIVE:     Vital Signs (Most Recent)  Temp: 98.1 °F (36.7 °C) (22 1059)  Pulse: 60 (22 1059)  Resp: 18 (22 1059)  BP: (!) 189/82 (22 1059)  SpO2: 98 % (22  1059)    Physical Exam:  :Ht: 5' (152.4 cm)    Wt: 88 kg (194 lb)    BMI: 37.89 kg/m²                                                          GENERAL:  Comfortable, in no acute distress.                                 HEENT EXAM:  Nonicteric.  No adenopathy.  Oropharynx is clear.               NECK:  Supple.                                                               LUNGS:  Clear.                                                               CARDIAC:  Regular rate and rhythm.  S1, S2.  No murmur.                      ABDOMEN:  OBESE.  Soft, positive bowel sounds, nontender.  No hepatosplenomegaly or masses.  No rebound or guarding.                                             EXTREMITIES:  No edema.     MENTAL STATUS:  Alert and oriented.    ASSESSMENT/PLAN:     Assessment: GERD.  Dysphagia.  Colon cancer screening.    Plan: Gastroscopy and Colonoscopy    Anesthesia Plan:   MAC / General Anaesthesia    ASA Grade: ASA 2 - Patient with mild systemic disease with no functional limitations    MALLAMPATI SCORE: I (soft palate, uvula, fauces, and tonsillar pillars visible)

## 2022-05-04 ENCOUNTER — ANESTHESIA (OUTPATIENT)
Dept: ENDOSCOPY | Facility: HOSPITAL | Age: 62
End: 2022-05-04
Payer: COMMERCIAL

## 2022-05-04 ENCOUNTER — HOSPITAL ENCOUNTER (OUTPATIENT)
Facility: HOSPITAL | Age: 62
Discharge: HOME | End: 2022-05-04
Attending: INTERNAL MEDICINE | Admitting: INTERNAL MEDICINE
Payer: COMMERCIAL

## 2022-05-04 ENCOUNTER — ANESTHESIA EVENT (OUTPATIENT)
Dept: ENDOSCOPY | Facility: HOSPITAL | Age: 62
End: 2022-05-04
Payer: COMMERCIAL

## 2022-05-04 VITALS
HEIGHT: 60 IN | TEMPERATURE: 98 F | WEIGHT: 194 LBS | OXYGEN SATURATION: 99 % | HEART RATE: 61 BPM | DIASTOLIC BLOOD PRESSURE: 67 MMHG | BODY MASS INDEX: 38.09 KG/M2 | RESPIRATION RATE: 15 BRPM | SYSTOLIC BLOOD PRESSURE: 141 MMHG

## 2022-05-04 DIAGNOSIS — R13.10 DYSPHAGIA: ICD-10-CM

## 2022-05-04 PROBLEM — Z12.11 SCREENING FOR COLON CANCER: Status: ACTIVE | Noted: 2022-05-04

## 2022-05-04 PROCEDURE — 88305 TISSUE EXAM BY PATHOLOGIST: ICD-10-PCS | Mod: 26,,, | Performed by: PATHOLOGY

## 2022-05-04 PROCEDURE — G0121 COLON CA SCRN NOT HI RSK IND: HCPCS | Mod: ,,, | Performed by: INTERNAL MEDICINE

## 2022-05-04 PROCEDURE — 43239 EGD BIOPSY SINGLE/MULTIPLE: CPT | Mod: 59,,, | Performed by: INTERNAL MEDICINE

## 2022-05-04 PROCEDURE — 27201012 HC FORCEPS, HOT/COLD, DISP: Mod: PO | Performed by: INTERNAL MEDICINE

## 2022-05-04 PROCEDURE — 63600175 PHARM REV CODE 636 W HCPCS: Mod: PO | Performed by: NURSE ANESTHETIST, CERTIFIED REGISTERED

## 2022-05-04 PROCEDURE — 88342 IMHCHEM/IMCYTCHM 1ST ANTB: CPT | Performed by: PATHOLOGY

## 2022-05-04 PROCEDURE — 43248 EGD GUIDE WIRE INSERTION: CPT | Mod: PO | Performed by: INTERNAL MEDICINE

## 2022-05-04 PROCEDURE — 88342 IMHCHEM/IMCYTCHM 1ST ANTB: CPT | Mod: 26,,, | Performed by: PATHOLOGY

## 2022-05-04 PROCEDURE — G0105 COLORECTAL SCRN; HI RISK IND: HCPCS | Mod: PO | Performed by: INTERNAL MEDICINE

## 2022-05-04 PROCEDURE — D9220A PRA ANESTHESIA: ICD-10-PCS | Mod: CRNA,,, | Performed by: NURSE ANESTHETIST, CERTIFIED REGISTERED

## 2022-05-04 PROCEDURE — G0121 COLON CA SCRN NOT HI RSK IND: ICD-10-PCS | Mod: ,,, | Performed by: INTERNAL MEDICINE

## 2022-05-04 PROCEDURE — 43248 EGD GUIDE WIRE INSERTION: CPT | Mod: ,,, | Performed by: INTERNAL MEDICINE

## 2022-05-04 PROCEDURE — D9220A PRA ANESTHESIA: Mod: ANES,,, | Performed by: ANESTHESIOLOGY

## 2022-05-04 PROCEDURE — 88342 CHG IMMUNOCYTOCHEMISTRY: ICD-10-PCS | Mod: 26,,, | Performed by: PATHOLOGY

## 2022-05-04 PROCEDURE — 37000009 HC ANESTHESIA EA ADD 15 MINS: Mod: PO | Performed by: INTERNAL MEDICINE

## 2022-05-04 PROCEDURE — D9220A PRA ANESTHESIA: ICD-10-PCS | Mod: ANES,,, | Performed by: ANESTHESIOLOGY

## 2022-05-04 PROCEDURE — 43239 EGD BIOPSY SINGLE/MULTIPLE: CPT | Mod: 59,PO | Performed by: INTERNAL MEDICINE

## 2022-05-04 PROCEDURE — 88305 TISSUE EXAM BY PATHOLOGIST: CPT | Mod: 26,,, | Performed by: PATHOLOGY

## 2022-05-04 PROCEDURE — 25000003 PHARM REV CODE 250: Mod: PO | Performed by: NURSE ANESTHETIST, CERTIFIED REGISTERED

## 2022-05-04 PROCEDURE — D9220A PRA ANESTHESIA: Mod: CRNA,,, | Performed by: NURSE ANESTHETIST, CERTIFIED REGISTERED

## 2022-05-04 PROCEDURE — 37000008 HC ANESTHESIA 1ST 15 MINUTES: Mod: PO | Performed by: INTERNAL MEDICINE

## 2022-05-04 PROCEDURE — 43239 PR EGD, FLEX, W/BIOPSY, SGL/MULTI: ICD-10-PCS | Mod: 59,,, | Performed by: INTERNAL MEDICINE

## 2022-05-04 PROCEDURE — 43248 PR EGD, FLEX, W/DILATION OVER GUIDEWIRE: ICD-10-PCS | Mod: ,,, | Performed by: INTERNAL MEDICINE

## 2022-05-04 PROCEDURE — 63600175 PHARM REV CODE 636 W HCPCS: Mod: PO | Performed by: INTERNAL MEDICINE

## 2022-05-04 PROCEDURE — 88305 TISSUE EXAM BY PATHOLOGIST: CPT | Performed by: PATHOLOGY

## 2022-05-04 RX ORDER — FAMOTIDINE 40 MG/1
40 TABLET, FILM COATED ORAL NIGHTLY
Qty: 60 TABLET | Refills: 5 | Status: SHIPPED | OUTPATIENT
Start: 2022-05-04 | End: 2023-09-22

## 2022-05-04 RX ORDER — PROPOFOL 10 MG/ML
VIAL (ML) INTRAVENOUS CONTINUOUS PRN
Status: DISCONTINUED | OUTPATIENT
Start: 2022-05-04 | End: 2022-05-04

## 2022-05-04 RX ORDER — FENTANYL CITRATE 50 UG/ML
INJECTION, SOLUTION INTRAMUSCULAR; INTRAVENOUS
Status: DISCONTINUED | OUTPATIENT
Start: 2022-05-04 | End: 2022-05-04

## 2022-05-04 RX ORDER — SODIUM CHLORIDE, SODIUM LACTATE, POTASSIUM CHLORIDE, CALCIUM CHLORIDE 600; 310; 30; 20 MG/100ML; MG/100ML; MG/100ML; MG/100ML
INJECTION, SOLUTION INTRAVENOUS CONTINUOUS
Status: DISCONTINUED | OUTPATIENT
Start: 2022-05-04 | End: 2022-05-04 | Stop reason: HOSPADM

## 2022-05-04 RX ORDER — SODIUM CHLORIDE 0.9 % (FLUSH) 0.9 %
10 SYRINGE (ML) INJECTION
Status: DISCONTINUED | OUTPATIENT
Start: 2022-05-04 | End: 2022-05-04 | Stop reason: HOSPADM

## 2022-05-04 RX ORDER — AMLODIPINE BESYLATE 5 MG/1
5 TABLET ORAL DAILY
Qty: 30 TABLET | Refills: 1 | Status: SHIPPED | OUTPATIENT
Start: 2022-05-04 | End: 2022-06-03

## 2022-05-04 RX ORDER — LIDOCAINE HCL/PF 100 MG/5ML
SYRINGE (ML) INTRAVENOUS
Status: DISCONTINUED | OUTPATIENT
Start: 2022-05-04 | End: 2022-05-04

## 2022-05-04 RX ORDER — PROPOFOL 10 MG/ML
VIAL (ML) INTRAVENOUS
Status: DISCONTINUED | OUTPATIENT
Start: 2022-05-04 | End: 2022-05-04

## 2022-05-04 RX ADMIN — FENTANYL CITRATE 50 MCG: 50 INJECTION, SOLUTION INTRAMUSCULAR; INTRAVENOUS at 12:05

## 2022-05-04 RX ADMIN — PROPOFOL 100 MG: 10 INJECTION, EMULSION INTRAVENOUS at 12:05

## 2022-05-04 RX ADMIN — SODIUM CHLORIDE, SODIUM LACTATE, POTASSIUM CHLORIDE, AND CALCIUM CHLORIDE: .6; .31; .03; .02 INJECTION, SOLUTION INTRAVENOUS at 11:05

## 2022-05-04 RX ADMIN — PROPOFOL 50 MG: 10 INJECTION, EMULSION INTRAVENOUS at 12:05

## 2022-05-04 RX ADMIN — PROPOFOL 150 MCG/KG/MIN: 10 INJECTION, EMULSION INTRAVENOUS at 12:05

## 2022-05-04 RX ADMIN — LIDOCAINE HYDROCHLORIDE 100 MG: 20 INJECTION, SOLUTION INTRAVENOUS at 12:05

## 2022-05-04 NOTE — PATIENT INSTRUCTIONS
Recovery After Procedural Sedation (Adult)   You have been given medicine by vein to make you sleep during your surgery. This may have included both a pain medicine and sleeping medicine. Most of the effects have worn off. But you may still have some drowsiness for the next 6 to 8 hours.  Home care  Follow these guidelines when you get home:  For the next 8 hours, you should be watched by a responsible adult. This person should make sure your condition is not getting worse.  Don't drink any alcohol for the next 24 hours.  Don't drive, operate dangerous machinery, or make important business or personal decisions during the next 24 hours.  To prevent injury or falls, use caution when standing and walking for at least 24 hours after your procedure.  Note: Your healthcare provider may tell you not to take any medicine by mouth for pain or sleep in the next 4 hours. These medicines may react with the medicines you were given in the hospital. This could cause a much stronger response than usual.  Follow-up care  Follow up with your healthcare provider if you are not alert and back to your usual level of activity within 12 hours.  When to seek medical advice  Call your healthcare provider right away if any of these occur:  Drowsiness gets worse  Weakness or dizziness gets worse  Repeated vomiting  You can't be awakened  Fever  New rash  [email or web address] last reviewed this educational content on 9/1/2019  © 2574-1753 [employer]. [address] [place] [address], [place] [address]. All rights reserved. This information is not intended as a substitute for professional medical care. Always follow your healthcare professional's instructions         Tips to Control Acid Reflux    To control acid reflux, youll need to make some basic diet and lifestyle changes. The simple steps outlined below may be all youll need to ease discomfort.  Watch what you eat  Avoid fatty foods and spicy foods.  Eat fewer acidic foods, such as citrus  and tomato-based foods. These can increase symptoms.  Limit drinking alcohol, caffeine, and fizzy beverages. All increase acid reflux.  Try limiting chocolate, peppermint, and spearmint. These can worsen acid reflux in some people.  Watch when you eat  Avoid lying down for 3 hours after eating.  Do not snack before going to bed.  Raise your head  Raising your head and upper body by 4 to 6 inches helps limit reflux when youre lying down. Put blocks under the head of your bed frame to raise it.  Other changes  Lose weight, if you need to  Dont exercise near bedtime  Avoid tight-fitting clothes  Limit aspirin and ibuprofen  Stop smoking   Date Last Reviewed: 7/1/2016  © 6136-6648 [employer]. [address] [place] [address], [place] [address]. All rights reserved. This information is not intended as a substitute for professional medical care. Always follow your healthcare professional's instructions.         High-Fiber Diet  Fiber is in fruits, vegetables, cereals, and grains. Fiber passes through your body undigested. A high-fiber diet helps food move through your intestinal tract. The added bulk is helpful in preventing constipation. In people with diverticulosis, fiber helps clean out the pouches along the colon wall. It also prevents new pouches from forming. A high-fiber diet reduces the risk of colon cancer. It also lowers blood cholesterol and prevents high blood sugar in people with diabetes.    The fiber-rich foods listed below should be part of your diet. If you are not used to high-fiber foods, start with 1 or 2 foods from this list. Every 3 to 4 days add a new one to your diet. Do this until you are eating 4 high-fiber foods per day. This should give you 20 to 35 grams of fiber a day. It is also important to drink a lot of water when you are on this diet. You should have 6 to 8 glasses of water a day. Water makes the fiber swell and increases the benefit.  Foods high in dietary fiber  The following  foods are high in dietary fiber:  Breads. Breads made with 100% whole-wheat flour; marialuisa, wheat, or rye crackers; whole-grain tortillas, bran muffins.  Cereals. Whole-grain and bran cereals with bran (shredded wheat, wheat flakes, raisin bran, corn bran); oatmeal, rolled oats, granola, and brown rice.  Fruits. Fresh fruits and their edible skins (pears, prunes, raisins, berries, apples, and apricots); bananas, citrus fruit, mangoes, pineapple; and prune juice.  Nuts. Any nuts and seeds.  Vegetables. Best served raw or lightly cooked. All types, especially: green peas, celery, eggplant, potatoes, spinach, broccoli, [place] sprouts, winter squash, carrots, cauliflower, soybeans, lentils, and fresh and dried beans of all kinds.  Other. Popcorn, any spices.  Date Last Reviewed: 8/1/2016  © 8197-3279 [employer]. [address] [place] [address]. All rights reserved. This information is not intended as a substitute for professional medical care. Always follow your healthcare professional's instructions.

## 2022-05-04 NOTE — ANESTHESIA PREPROCEDURE EVALUATION
05/04/2022  Kimberly Jolley is a 61 y.o., female.      Pre-op Assessment    I have reviewed the NPO Status.   I have reviewed the Medications.     Review of Systems  Anesthesia Hx:  No problems with previous Anesthesia    Cardiovascular:   Hypertension    Pulmonary:  Pulmonary Normal    Hepatic/GI:   GERD Liver Disease,    Neurological:  Neurology Normal    Endocrine:   Hypothyroidism  Obesity / BMI > 30  Psych:   Psychiatric History anxiety          Physical Exam  General: Well nourished        Anesthesia Plan  Type of Anesthesia, risks & benefits discussed:    Anesthesia Type: Gen Natural Airway  Intra-op Monitoring Plan: Standard ASA Monitors  Induction:  IV  Informed Consent: Informed consent signed with the Patient and all parties understand the risks and agree with anesthesia plan.  All questions answered.   ASA Score: 3    Ready For Surgery From Anesthesia Perspective.     .

## 2022-05-04 NOTE — ANESTHESIA POSTPROCEDURE EVALUATION
Anesthesia Post Evaluation    Patient: Kimberly Jolley    Procedure(s) Performed: Procedure(s) (LRB):  EGD (ESOPHAGOGASTRODUODENOSCOPY) (N/A)  COLONOSCOPY (N/A)    Final Anesthesia Type: general      Patient location during evaluation: PACU  Patient participation: Yes- Able to Participate  Level of consciousness: awake and alert and oriented  Post-procedure vital signs: reviewed and stable  Pain management: adequate  Airway patency: patent    PONV status at discharge: No PONV  Anesthetic complications: no      Cardiovascular status: blood pressure returned to baseline  Respiratory status: unassisted, spontaneous ventilation and room air  Hydration status: euvolemic  Follow-up not needed.          Vitals Value Taken Time   /67 05/04/22 1330   Temp 36.8 °C (98.2 °F) 05/04/22 1315   Pulse 61 05/04/22 1330   Resp 15 05/04/22 1330   SpO2 99 % 05/04/22 1330         Event Time   Out of Recovery 13:50:00         Pain/Lenka Score: Lenka Score: 10 (5/4/2022  1:40 PM)

## 2022-05-04 NOTE — PROVATION PATIENT INSTRUCTIONS
Discharge Summary/Instructions for after Colonoscopy without   Biopsy/Polypectomy  Kimberly Jolley    Wednesday, May 4, 2022  Emil Connelly MD  RESTRICTIONS ON ACTIVITY:  - Do not drive a car or operate machinery until the day after the procedure.      - The following day: return to full activity including work.  - For  3 days: No heavy lifting, straining or running.  - Diet: You may eat solid foods, but no gassy foods (i.e. beans, broccoli,   cabbage, etc).  TREATMENT FOR COMMON SIDE EFFECTS:  - Mild abdominal pain and bloating or excessive gas: rest, eat lightly and   use a heating pad.  SYMPTOMS TO WATCH FOR AND REPORT TO YOUR PHYSICIAN:  1. Severe abdominal pain.  2. Fever within 24 hours after a procedure.  3. A large amount of rectal bleeding. (A small amount of blood from the   rectum is not serious, especially if hemorrhoids are present.  3.  Because air was put into your colon during the procedure, expelling   large amounts of air from your rectum is normal.  4.  You may not have a bowel movement for 1-3 days because of the   colonoscopy prep.  This is normal.  5.  Call immediately if you notice any of the following:   Chills and/or fever over 101   Persistent vomiting   Severe abdominal pain, other than gas cramps   Severe chest pain   Black, tarry stools   Any bleeding - exceeding one tablespoon  Your doctor recommends these additional instructions:  You are being discharged to home.   Eat a high fiber diet.   Take a fiber supplement, for example Citrucel, Fibercon, Konsyl or   Metamucil.   Take Miralax 1 capful (17 grams) in 8 ounces of water by mouth once a day as   needed.   Your physician has recommended a repeat colonoscopy in 10 years for   screening purposes.  None  If you have any questions or problems, please call your physician.  EMERGENCY PHONE NUMBER: [phone]  LAB RESULTS: Call in two (2) weeks for lab results, [phone]  ___________________________________________  Nurse  Signature  ___________________________________________  Patient/Designated Responsible Party Signature  Emil Connelly MD  5/4/2022 1:42:58 PM  This report has been verified and signed electronically.  Dear patient,  As a result of recent federal legislation (The Federal Cures Act), you may   receive lab or pathology results from your procedure in your [facility]   account before your physician is able to contact you. Your physician or   their representative will relay the results to you with their   recommendations at their soonest availability.  Thank you.  PROVATION

## 2022-05-04 NOTE — TRANSFER OF CARE
Anesthesia Transfer of Care Note    Patient: Kimberly Jolley    Procedure(s) Performed: Procedure(s) (LRB):  EGD (ESOPHAGOGASTRODUODENOSCOPY) (N/A)  COLONOSCOPY (N/A)    Patient location: PACU    Anesthesia Type: general    Transport from OR: Transported from OR on 2-3 L/min O2 by NC with adequate spontaneous ventilation    Post pain: adequate analgesia    Post assessment: no apparent anesthetic complications and tolerated procedure well    Post vital signs: stable    Level of consciousness: responds to stimulation    Nausea/Vomiting: no nausea/vomiting    Complications: none    Transfer of care protocol was followed      Last vitals:   Visit Vitals  BP (!) 189/82 (BP Location: Right arm, Patient Position: Sitting)   Pulse 60   Temp 36.7 °C (98.1 °F) (Skin)   Resp 18   Ht 5' (1.524 m)   Wt 88 kg (194 lb)   SpO2 98%   Breastfeeding No   BMI 37.89 kg/m²

## 2022-05-05 ENCOUNTER — TELEPHONE (OUTPATIENT)
Dept: GASTROENTEROLOGY | Facility: CLINIC | Age: 62
End: 2022-05-05
Payer: COMMERCIAL

## 2022-05-10 ENCOUNTER — TELEPHONE (OUTPATIENT)
Dept: GASTROENTEROLOGY | Facility: CLINIC | Age: 62
End: 2022-05-10
Payer: COMMERCIAL

## 2022-05-10 NOTE — PROVATION PATIENT INSTRUCTIONS
Discharge Summary/Instructions after an Endoscopic Procedure  Patient Name: Kimberly Jolley  Patient MRN: [id number]  Patient YOB: 1960  Wednesday, May 4, 2022  Emil Connelly MD  Dear patient,  As a result of recent federal legislation (The Federal Cures Act), you may   receive lab or pathology results from your procedure in your [facility]   account before your physician is able to contact you. Your physician or   their representative will relay the results to you with their   recommendations at their soonest availability.  Thank you,  RESTRICTIONS:  During your procedure today, you received medications for sedation.  These   medications may affect your judgment, balance and coordination.  Therefore,   for 24 hours, you have the following restrictions:   - DO NOT drive a car, operate machinery, make legal/financial decisions,   sign important papers or drink alcohol.    ACTIVITY:  Today: no heavy lifting, straining or running due to procedural   sedation/anesthesia.  The following day: return to full activity including work.  DIET:  Eat and drink normally unless instructed otherwise.     TREATMENT FOR COMMON SIDE EFFECTS:  - Mild abdominal pain, nausea, belching, bloating or excessive gas:  rest,   eat lightly and use a heating pad.  - Sore Throat: treat with throat lozenges and/or gargle with warm salt   water.  - Because air was used during the procedure, expelling large amounts of air   from your rectum or belching is normal.  - If a bowel prep was taken, you may not have a bowel movement for 1-3 days.    This is normal.  SYMPTOMS TO WATCH FOR AND REPORT TO YOUR PHYSICIAN:  1. Abdominal pain or bloating, other than gas cramps.  2. Chest pain.  3. Back pain.  4. Signs of infection such as: chills or fever occurring within 24 hours   after the procedure.  5. Rectal bleeding, which would show as bright red, maroon, or black stools.   (A tablespoon of blood from the rectum is not serious, especially  if   hemorrhoids are present.)  6. Vomiting.  7. Weakness or dizziness.  GO DIRECTLY TO THE NEAREST EMERGENCY ROOM IF YOU HAVE ANY OF THE FOLLOWING:      Difficulty breathing              Chills and/or fever over 101 F   Persistent vomiting and/or vomiting blood   Severe abdominal pain   Severe chest pain   Black, tarry stools   Bleeding- more than one tablespoon   Any other symptom or condition that you feel may need urgent attention  Your doctor recommends these additional instructions:  If any biopsies were taken, your doctors clinic will contact you in 1 to 2   weeks with any results.  Follow an antireflux regimen.  This includes:       - Do not lie down for at least 3 to 4 hours after meals.        - Raise the head of the bed 4 to 6 inches.        - Decrease excess weight.        - Avoid citrus juices and other acidic foods, alcohol, chocolate, mints,   coffee and other caffeinated beverages, carbonated beverages, fatty and   fried foods.        - Avoid tight-fitting clothing.        - Avoid cigarettes and other tobacco products.  Especially:  Exercise and weight loss.    Continue your present medications.   Take Protonix (pantoprazole) 40 mg by mouth once a day.   Take Pepcid (famotidine) 40 mg by mouth nightly for two months.   Call the [facility] clinic in 2 weeks for reports (if you haven't heard from us   sooner)  [phone].  Your physician has recommended a repeat upper endoscopy as needed for   retreatment.  For questions, problems or results please call your physician - Emil Connelly MD at Work:  [phone].  EMERGENCY PHONE NUMBER: [phone], LAB RESULTS: [phone]  IF A COMPLICATION OR EMERGENCY SITUATION ARISES AND YOU ARE UNABLE TO REACH   YOUR PHYSICIAN - GO DIRECTLY TO THE EMERGENCY ROOM.  ___________________________________________  Nurse Signature  ___________________________________________  Patient/Designated Responsible Party Signature  Emil Connelly MD  5/9/2022 8:58:15  PM  PROVATION

## 2022-05-12 LAB
FINAL PATHOLOGIC DIAGNOSIS: NORMAL
Lab: NORMAL

## 2022-05-31 ENCOUNTER — PATIENT MESSAGE (OUTPATIENT)
Dept: ADMINISTRATIVE | Facility: HOSPITAL | Age: 62
End: 2022-05-31
Payer: COMMERCIAL

## 2022-06-02 ENCOUNTER — TELEPHONE (OUTPATIENT)
Dept: GASTROENTEROLOGY | Facility: CLINIC | Age: 62
End: 2022-06-02
Payer: COMMERCIAL

## 2022-06-02 DIAGNOSIS — F41.0 PANIC ATTACKS: ICD-10-CM

## 2022-06-02 DIAGNOSIS — F41.1 GENERALIZED ANXIETY DISORDER: ICD-10-CM

## 2022-06-02 DIAGNOSIS — I10 UNCONTROLLED HYPERTENSION: ICD-10-CM

## 2022-06-02 NOTE — TELEPHONE ENCOUNTER
No new care gaps identified.  [facility] Embedded Care Gaps. Reference number: [id number]. 6/02/2022   12:09:38 PM CDT

## 2022-06-02 NOTE — TELEPHONE ENCOUNTER
Refill Routing Note   Medication(s) are not appropriate for processing by [facility] for the following reason(s):      - Required vitals are abnormal  - Patient has been seen in the ED/Hospital since the last PCP visit    [facility] action(s):  Defer          Medication reconciliation completed: No     Appointments  past 12m or future 3m with PCP    Date Provider   Last Visit   11/26/2021 Benji Seo MD   Next Visit   Visit date not found Benji Seo MD   ED visits in past 90 days: 0        Note composed:12:19 PM 06/02/2022

## 2022-06-03 ENCOUNTER — PATIENT MESSAGE (OUTPATIENT)
Dept: FAMILY MEDICINE | Facility: CLINIC | Age: 62
End: 2022-06-03
Payer: COMMERCIAL

## 2022-06-03 RX ORDER — AMLODIPINE BESYLATE 5 MG/1
5 TABLET ORAL DAILY
Qty: 60 TABLET | Refills: 0 | Status: SHIPPED | OUTPATIENT
Start: 2022-06-03 | End: 2022-08-27

## 2022-08-10 NOTE — LETTER
March 28, 2018      Benji Seo MD  [address]  [facility] [address]           [facility]  [address] [facility] [address] [place] [address]  Phone: [phone]          Patient: Kimberly Jolley   MR Number: [id number]   YOB: 1960   Date of Visit: 3/28/2018       Dear Dr. Benji Seo:    Thank you for referring Kimberly Jolley to me for evaluation. Attached you will find relevant portions of my assessment and plan of care.    If you have questions, please do not hesitate to call me. I look forward to following Kimberly Jolley along with you.    Sincerely,    Alireza Clark MD    Enclosure  CC:  No Recipients    If you would like to receive this communication electronically, please contact [email or web address] or [phone] to request more information on [employer] Link access.    For providers and/or their staff who would like to refer a patient to [facility], please contact us through our one-stop-shop provider referral line, [facility], at [phone].    If you feel you have received this communication in error or would no longer like to receive these types of communications, please e-mail [email or web address]          operating room

## 2022-08-12 ENCOUNTER — PATIENT MESSAGE (OUTPATIENT)
Dept: FAMILY MEDICINE | Facility: CLINIC | Age: 62
End: 2022-08-12
Payer: COMMERCIAL

## 2022-08-24 ENCOUNTER — PATIENT MESSAGE (OUTPATIENT)
Dept: ADMINISTRATIVE | Facility: HOSPITAL | Age: 62
End: 2022-08-24
Payer: COMMERCIAL

## 2022-08-30 ENCOUNTER — PATIENT MESSAGE (OUTPATIENT)
Dept: FAMILY MEDICINE | Facility: CLINIC | Age: 62
End: 2022-08-30
Payer: COMMERCIAL

## 2022-09-26 DIAGNOSIS — E89.0 POSTOPERATIVE HYPOTHYROIDISM: ICD-10-CM

## 2022-09-26 NOTE — TELEPHONE ENCOUNTER
Care Due:                  Date            Visit Type   Department     Provider  --------------------------------------------------------------------------------                                EP -                              PRIMARY      [facility] FAMILY  Last Visit: 11-      CARE ([facility])   MEDICINE       Benji Seo  Next Visit: None Scheduled  None         None Found                                                            Last  Test          Frequency    Reason                     Performed    Due Date  --------------------------------------------------------------------------------    Office Visit  12 months..  amLODIPine, atorvastatin,   11- 11-                             pantoprazole, traZODone..    CMP.........  12 months..  atorvastatin.............  12- 11-    [employer] Embedded Care Gaps. Reference number: [id number]. 9/26/2022   10:27:25 AM CDT

## 2022-09-27 RX ORDER — LEVOTHYROXINE SODIUM 175 UG/1
TABLET ORAL
Qty: 30 TABLET | Refills: 0 | Status: SHIPPED | OUTPATIENT
Start: 2022-09-27 | End: 2022-12-01

## 2022-09-28 NOTE — TELEPHONE ENCOUNTER
Spoke with pt. Notified pt med refill sent to pharmacy. Scheduled pt for appt on 11/11/22 and labs on 11/3/22.

## 2022-10-10 ENCOUNTER — PATIENT MESSAGE (OUTPATIENT)
Dept: ADMINISTRATIVE | Facility: HOSPITAL | Age: 62
End: 2022-10-10
Payer: COMMERCIAL

## 2022-10-17 DIAGNOSIS — I10 UNCONTROLLED HYPERTENSION: ICD-10-CM

## 2022-10-17 DIAGNOSIS — F41.1 GENERALIZED ANXIETY DISORDER: ICD-10-CM

## 2022-10-17 DIAGNOSIS — F41.0 PANIC ATTACKS: ICD-10-CM

## 2022-10-17 NOTE — TELEPHONE ENCOUNTER
Care Due:                  Date            Visit Type   Department     Provider  --------------------------------------------------------------------------------                                EP -                              [facility] FAMILY  Last Visit: 11-      CARE ([facility])   MEDICINE       Benji Seo                              EP -                              PRIMARY      [facility]  Next Visit: 11-      CARE ([facility])   MEDICINE       Benji Seo                                                            Last  Test          Frequency    Reason                     Performed    Due Date  --------------------------------------------------------------------------------    Lipid Panel.  12 months..  atorvastatin.............  05-   05-    [facility] Embedded Care Gaps. Reference number: [id number]. 10/17/2022   9:19:01 AM CDT

## 2022-10-21 RX ORDER — AMLODIPINE BESYLATE 5 MG/1
5 TABLET ORAL DAILY
Qty: 30 TABLET | Refills: 0 | Status: SHIPPED | OUTPATIENT
Start: 2022-10-21 | End: 2022-11-15

## 2022-10-22 NOTE — TELEPHONE ENCOUNTER
Approved one time only. Needs appt.   Saturation: Site=PA (Pulmonary Artery) , O2=72.6 %, Hgb=11.8 gm/dl, Condition=Condition 1. Used in calculation.

## 2022-10-28 ENCOUNTER — PATIENT OUTREACH (OUTPATIENT)
Dept: ADMINISTRATIVE | Facility: HOSPITAL | Age: 62
End: 2022-10-28
Payer: COMMERCIAL

## 2022-10-28 ENCOUNTER — PATIENT MESSAGE (OUTPATIENT)
Dept: ADMINISTRATIVE | Facility: HOSPITAL | Age: 62
End: 2022-10-28
Payer: COMMERCIAL

## 2022-10-28 NOTE — LETTER
November 4, 2022    Kimberly Jolley  [address] [place] [address]             [facility]  [address]  [facility] [address]  Phone: [phone] Dear Karen [facility] is committed to your overall health.  To help you get the most out of each of your visits, we will review your information to make sure you are up to date on all of your recommended tests and/or procedures.       Benji Seo MD  has found that your chart shows you may be due for :         Health Maintenance Due   Topic    Cervical Cancer Screening     HIV Screening     Mammogram     Influenza Vaccine         If you have had any of the above done at another facility, please bring the records or information with you so that your record at [facility] will be complete.  If you would like to schedule any of these test, please call [phone] or send a message via [email or web address].        If you are currently taking medication, please bring it with you to your appointment for review.           Thank you for letting us care for you,        Benji Seo MD and your [facility] Team

## 2022-10-28 NOTE — PROGRESS NOTES
10/28/2022 Care Everywhere updates requested and reviewed.  Immunizations reconciled. Media reports reviewed.  Duplicate HM overrides and  orders removed.  Overdue HM topic chart audit and/or requested.  Overdue lab testing linked to upcoming lab appointments if applies.  Lab briseida, and [facility] reviewed   Pap Smear and Lab testing     DIS reviewed      Mammogram    Health Maintenance Due   Topic Date Due    Cervical Cancer Screening  Never done    HIV Screening  Never done    Mammogram  2022    Influenza Vaccine (1) 2022

## 2022-11-03 ENCOUNTER — LAB VISIT (OUTPATIENT)
Dept: LAB | Facility: HOSPITAL | Age: 62
End: 2022-11-03
Attending: INTERNAL MEDICINE
Payer: COMMERCIAL

## 2022-11-03 DIAGNOSIS — Z00.00 HEALTHCARE MAINTENANCE: ICD-10-CM

## 2022-11-03 DIAGNOSIS — R73.01 IMPAIRED FASTING BLOOD SUGAR: ICD-10-CM

## 2022-11-03 LAB
ALBUMIN SERPL BCP-MCNC: 4.1 G/DL (ref 3.5–5.2)
ALP SERPL-CCNC: 83 U/L (ref 55–135)
ALT SERPL W/O P-5'-P-CCNC: 23 U/L (ref 10–44)
ANION GAP SERPL CALC-SCNC: 13 MMOL/L (ref 8–16)
AST SERPL-CCNC: 19 U/L (ref 10–40)
BASOPHILS # BLD AUTO: 0.05 K/UL (ref 0–0.2)
BASOPHILS NFR BLD: 1 % (ref 0–1.9)
BILIRUB SERPL-MCNC: 0.5 MG/DL (ref 0.1–1)
BUN SERPL-MCNC: 16 MG/DL (ref 8–23)
CALCIUM SERPL-MCNC: 9.8 MG/DL (ref 8.7–10.5)
CHLORIDE SERPL-SCNC: 110 MMOL/L (ref 95–110)
CHOLEST SERPL-MCNC: 208 MG/DL (ref 120–199)
CHOLEST/HDLC SERPL: 4 {RATIO} (ref 2–5)
CO2 SERPL-SCNC: 20 MMOL/L (ref 23–29)
CREAT SERPL-MCNC: 0.9 MG/DL (ref 0.5–1.4)
DIFFERENTIAL METHOD: NORMAL
EOSINOPHIL # BLD AUTO: 0 K/UL (ref 0–0.5)
EOSINOPHIL NFR BLD: 0.6 % (ref 0–8)
ERYTHROCYTE [DISTWIDTH] IN BLOOD BY AUTOMATED COUNT: 12.9 % (ref 11.5–14.5)
EST. GFR  (NO RACE VARIABLE): >60 ML/MIN/1.73 M^2
ESTIMATED AVG GLUCOSE: 114 MG/DL (ref 68–131)
GLUCOSE SERPL-MCNC: 114 MG/DL (ref 70–110)
HBA1C MFR BLD: 5.6 % (ref 4–5.6)
HCT VFR BLD AUTO: 38 % (ref 37–48.5)
HDLC SERPL-MCNC: 52 MG/DL (ref 40–75)
HDLC SERPL: 25 % (ref 20–50)
HGB BLD-MCNC: 12.5 G/DL (ref 12–16)
HIV 1+2 AB+HIV1 P24 AG SERPL QL IA: NORMAL
IMM GRANULOCYTES # BLD AUTO: 0.01 K/UL (ref 0–0.04)
IMM GRANULOCYTES NFR BLD AUTO: 0.2 % (ref 0–0.5)
LDLC SERPL CALC-MCNC: 122.4 MG/DL (ref 63–159)
LYMPHOCYTES # BLD AUTO: 1.6 K/UL (ref 1–4.8)
LYMPHOCYTES NFR BLD: 32.2 % (ref 18–48)
MCH RBC QN AUTO: 30.9 PG (ref 27–31)
MCHC RBC AUTO-ENTMCNC: 32.9 G/DL (ref 32–36)
MCV RBC AUTO: 94 FL (ref 82–98)
MONOCYTES # BLD AUTO: 0.3 K/UL (ref 0.3–1)
MONOCYTES NFR BLD: 7.1 % (ref 4–15)
NEUTROPHILS # BLD AUTO: 2.8 K/UL (ref 1.8–7.7)
NEUTROPHILS NFR BLD: 58.9 % (ref 38–73)
NONHDLC SERPL-MCNC: 156 MG/DL
NRBC BLD-RTO: 0 /100 WBC
PLATELET # BLD AUTO: 214 K/UL (ref 150–450)
PMV BLD AUTO: 11 FL (ref 9.2–12.9)
POTASSIUM SERPL-SCNC: 4.3 MMOL/L (ref 3.5–5.1)
PROT SERPL-MCNC: 7.4 G/DL (ref 6–8.4)
RBC # BLD AUTO: 4.05 M/UL (ref 4–5.4)
SODIUM SERPL-SCNC: 143 MMOL/L (ref 136–145)
T3FREE SERPL-MCNC: 2.5 PG/ML (ref 2.3–4.2)
T4 FREE SERPL-MCNC: 0.91 NG/DL (ref 0.71–1.51)
TRIGL SERPL-MCNC: 168 MG/DL (ref 30–150)
TSH SERPL DL<=0.005 MIU/L-ACNC: 1.04 UIU/ML (ref 0.4–4)
WBC # BLD AUTO: 4.81 K/UL (ref 3.9–12.7)

## 2022-11-03 PROCEDURE — 84443 ASSAY THYROID STIM HORMONE: CPT | Performed by: INTERNAL MEDICINE

## 2022-11-03 PROCEDURE — 36415 COLL VENOUS BLD VENIPUNCTURE: CPT | Mod: PO | Performed by: INTERNAL MEDICINE

## 2022-11-03 PROCEDURE — 85025 COMPLETE CBC W/AUTO DIFF WBC: CPT | Performed by: INTERNAL MEDICINE

## 2022-11-03 PROCEDURE — 84481 FREE ASSAY (FT-3): CPT | Performed by: INTERNAL MEDICINE

## 2022-11-03 PROCEDURE — 84439 ASSAY OF FREE THYROXINE: CPT | Performed by: INTERNAL MEDICINE

## 2022-11-03 PROCEDURE — 80053 COMPREHEN METABOLIC PANEL: CPT | Performed by: INTERNAL MEDICINE

## 2022-11-03 PROCEDURE — 80061 LIPID PANEL: CPT | Performed by: INTERNAL MEDICINE

## 2022-11-03 PROCEDURE — 87389 HIV-1 AG W/HIV-1&-2 AB AG IA: CPT | Performed by: INTERNAL MEDICINE

## 2022-11-03 PROCEDURE — 83036 HEMOGLOBIN GLYCOSYLATED A1C: CPT | Performed by: INTERNAL MEDICINE

## 2022-11-11 ENCOUNTER — PATIENT MESSAGE (OUTPATIENT)
Dept: FAMILY MEDICINE | Facility: CLINIC | Age: 62
End: 2022-11-11
Payer: COMMERCIAL

## 2022-11-11 ENCOUNTER — OFFICE VISIT (OUTPATIENT)
Dept: FAMILY MEDICINE | Facility: CLINIC | Age: 62
End: 2022-11-11
Payer: COMMERCIAL

## 2022-11-11 VITALS
DIASTOLIC BLOOD PRESSURE: 75 MMHG | HEART RATE: 77 BPM | BODY MASS INDEX: 37.41 KG/M2 | OXYGEN SATURATION: 95 % | SYSTOLIC BLOOD PRESSURE: 115 MMHG | WEIGHT: 190.56 LBS | HEIGHT: 60 IN

## 2022-11-11 DIAGNOSIS — F43.9 SITUATIONAL STRESS: ICD-10-CM

## 2022-11-11 DIAGNOSIS — R73.09 IMPAIRED GLUCOSE METABOLISM: ICD-10-CM

## 2022-11-11 DIAGNOSIS — F41.1 GENERALIZED ANXIETY DISORDER: ICD-10-CM

## 2022-11-11 DIAGNOSIS — M79.662 PAIN OF LEFT CALF: ICD-10-CM

## 2022-11-11 DIAGNOSIS — I10 ESSENTIAL HYPERTENSION: Primary | ICD-10-CM

## 2022-11-11 DIAGNOSIS — M79.652 PAIN IN LEFT THIGH: ICD-10-CM

## 2022-11-11 DIAGNOSIS — E78.2 MIXED HYPERLIPIDEMIA: ICD-10-CM

## 2022-11-11 DIAGNOSIS — Z79.899 DRUG THERAPY: ICD-10-CM

## 2022-11-11 DIAGNOSIS — E66.01 CLASS 2 SEVERE OBESITY DUE TO EXCESS CALORIES WITH SERIOUS COMORBIDITY AND BODY MASS INDEX (BMI) OF 37.0 TO 37.9 IN ADULT: ICD-10-CM

## 2022-11-11 DIAGNOSIS — E89.0 POSTOPERATIVE HYPOTHYROIDISM: ICD-10-CM

## 2022-11-11 DIAGNOSIS — G47.9 SLEEP DISORDER: ICD-10-CM

## 2022-11-11 DIAGNOSIS — R73.01 IMPAIRED FASTING BLOOD SUGAR: ICD-10-CM

## 2022-11-11 DIAGNOSIS — Z83.3 FAMILY HISTORY OF DIABETES MELLITUS IN FATHER: ICD-10-CM

## 2022-11-11 DIAGNOSIS — F41.0 PANIC ATTACKS: ICD-10-CM

## 2022-11-11 DIAGNOSIS — Z01.89 ENCOUNTER FOR LABORATORY TEST: ICD-10-CM

## 2022-11-11 PROCEDURE — 99215 OFFICE O/P EST HI 40 MIN: CPT | Mod: S$GLB,,, | Performed by: INTERNAL MEDICINE

## 2022-11-11 PROCEDURE — 3008F BODY MASS INDEX DOCD: CPT | Mod: CPTII,S$GLB,, | Performed by: INTERNAL MEDICINE

## 2022-11-11 PROCEDURE — 3044F PR MOST RECENT HEMOGLOBIN A1C LEVEL <7.0%: ICD-10-PCS | Mod: CPTII,S$GLB,, | Performed by: INTERNAL MEDICINE

## 2022-11-11 PROCEDURE — 99215 PR OFFICE/OUTPT VISIT, EST, LEVL V, 40-54 MIN: ICD-10-PCS | Mod: S$GLB,,, | Performed by: INTERNAL MEDICINE

## 2022-11-11 PROCEDURE — 3074F SYST BP LT 130 MM HG: CPT | Mod: CPTII,S$GLB,, | Performed by: INTERNAL MEDICINE

## 2022-11-11 PROCEDURE — 3078F DIAST BP <80 MM HG: CPT | Mod: CPTII,S$GLB,, | Performed by: INTERNAL MEDICINE

## 2022-11-11 PROCEDURE — 1159F MED LIST DOCD IN RCRD: CPT | Mod: CPTII,S$GLB,, | Performed by: INTERNAL MEDICINE

## 2022-11-11 PROCEDURE — 3044F HG A1C LEVEL LT 7.0%: CPT | Mod: CPTII,S$GLB,, | Performed by: INTERNAL MEDICINE

## 2022-11-11 PROCEDURE — 3078F PR MOST RECENT DIASTOLIC BLOOD PRESSURE < 80 MM HG: ICD-10-PCS | Mod: CPTII,S$GLB,, | Performed by: INTERNAL MEDICINE

## 2022-11-11 PROCEDURE — 1160F RVW MEDS BY RX/DR IN RCRD: CPT | Mod: CPTII,S$GLB,, | Performed by: INTERNAL MEDICINE

## 2022-11-11 PROCEDURE — 99999 PR PBB SHADOW E&M-EST. PATIENT-LVL V: ICD-10-PCS | Mod: PBBFAC,,, | Performed by: INTERNAL MEDICINE

## 2022-11-11 PROCEDURE — 1159F PR MEDICATION LIST DOCUMENTED IN MEDICAL RECORD: ICD-10-PCS | Mod: CPTII,S$GLB,, | Performed by: INTERNAL MEDICINE

## 2022-11-11 PROCEDURE — 3008F PR BODY MASS INDEX (BMI) DOCUMENTED: ICD-10-PCS | Mod: CPTII,S$GLB,, | Performed by: INTERNAL MEDICINE

## 2022-11-11 PROCEDURE — 1160F PR REVIEW ALL MEDS BY PRESCRIBER/CLIN PHARMACIST DOCUMENTED: ICD-10-PCS | Mod: CPTII,S$GLB,, | Performed by: INTERNAL MEDICINE

## 2022-11-11 PROCEDURE — 99999 PR PBB SHADOW E&M-EST. PATIENT-LVL V: CPT | Mod: PBBFAC,,, | Performed by: INTERNAL MEDICINE

## 2022-11-11 PROCEDURE — 3074F PR MOST RECENT SYSTOLIC BLOOD PRESSURE < 130 MM HG: ICD-10-PCS | Mod: CPTII,S$GLB,, | Performed by: INTERNAL MEDICINE

## 2022-11-11 NOTE — PATIENT INSTRUCTIONS
Essential hypertension; Maintain < 2 Gm Na a day diet, and monitor BP at home; keep a log. [facility] log for office review.     Generalized anxiety disorder; Limit caffeine intake with stress reduction and regular exercise as tolerated. Has buspar to use as needed for anxiety. Has been doing well. Except for sleep problems last week, see below. On topiramate.     Situational stress; as above.     Panic attacks; have been doing well.     Sleep disorder; on topiramate; has not been using trazodone at evening for sleep; has been arising during night over last week; no new stress; try buspar 5-7.5 mg then to help her get back to sleep if needed. Limit caffeine. Exercise after midafternoon    Pain of left calf; suspect may be muscle spasm related but will check huyen US LLE to r/o DVT; no swelling noted; has been intermittent over last 6 weeks. Has robaxin to use for muscle spasms; hx neck w LBP.   -     US Lower Extremity Veins Left; Future; Expected date: 11/11/2022    Pain in left thigh: as above; keep well hydrated w min 6-8 glasses of fluid a day; use Gatorade/2 for hydration at times.   -     US Lower Extremity Veins Left; Future; Expected date: 11/11/2022    Postoperative hypothyroidism; TFT good; same  thyroxine dosing.     Impaired fasting blood sugar; Exercise recommended with weight reduction and low carb diet; we'll follow hemoglobin A1c's with you periodically.HgA1C 5.6;     Class 2 severe obesity due to excess calories with serious comorbidity and body mass index (BMI) of 37.0 to 37.9 in adult; Caloric restriction w regular exercise and weight reduction. Discussed new weight reducing meds injectables; will see which ones her insurance co covers.     Encounter for laboratory test: reviewed w pt and discussed.

## 2022-11-11 NOTE — PROGRESS NOTES
Subjective:       Patient ID: Kimberly Jolley is a 62 y.o. female.    Chief Complaint:  Reassessment and go over her lab results.    HPI: Patient here today for reassessment and go over her lab results.  Essential hypertension; Maintain < 2 Gm Na a day diet, and monitor BP at home; keep a log. Keep log for office review.  Blood pressure at home in the mid 110s over the mid 70s.  Here today manually she is 115/75 with pulse 77.    Generalized anxiety disorder; Limit caffeine intake with stress reduction and regular exercise as tolerated. Has buspar to use as needed for anxiety. Has been doing well. Except for sleep problems last week, see below. On topiramate.     Situational stress; as above.     Panic attacks; have been doing well.     Sleep disorder; on topiramate; has not been using trazodone at evening for sleep; consider retrying trazodone for initial insomnia as needed p.r.n.; has been arising during night over last week; no new stress; try buspar 5-7.5 mg then to help her get back to sleep if needed. Limit caffeine. Exercise after midafternoon.    Mixed hyperlipidemia:  Low-fat high-fiber diet with restriction dairy products and alcohol as well will help.  Lipid profile:  Cholesterol 208 worsened from 169 with triglyceride 168 worsened from 01/30 9 and HDL 52 and .4 worsened from 84.2.  On atorvastatin 10 mg daily    Pain of left calf; no clinical symptoms of DVT; suspect may be muscle spasm related but will check huyen US LLE to r/o DVT; no swelling noted; has been intermittent over last 6 weeks. Has robaxin to use for muscle spasms; hx neck w LBP.  Has advised her not to sleep with her legs crossed at night.  No claudication symptoms noted.  No edema to lower extremities noted.  -     US Lower Extremity Veins Left; Future; Expected date: 11/11/2022    Pain in left thigh: as above; keep well hydrated w min 6-8 glasses of fluid a day; use Gatorade/2 for hydration at times.  Have advised her not to sleep  with her legs crossed at night.  Has Robaxin to use for muscle spasms on a p.r.n. basis  -      Lower Extremity Veins Left; Future; Expected date: 11/11/2022    Postoperative hypothyroidism; TFT good; same  thyroxine dosing.  TSH 1.04 with free T4 0.91    Impaired fasting blood sugar; Exercise recommended with weight reduction and low carb diet; we'll follow hemoglobin A1c's with you periodically.HgA1C 5.6;     Impaired glucose metabolism: .Exercise recommended with weight reduction and low carb diet; we'll follow hemoglobin A1c's with you periodically.  Hemoglobin A1c has improved presently staying stable at 5.6    Family history of diabetes involving her father    Class 2 severe obesity due to excess calories with serious comorbidity and body mass index (BMI) of 37.0 to 37.9 in adult; Caloric restriction w regular exercise and weight reduction. Discussed new weight reducing meds injectables; will see which ones her insurance co. covers.  Willing to try for weight reduction.  Stressed the importance of continuing to follow her diet with exercise regiment as tolerated.    Encounter for laboratory test: reviewed w pt and discussed.     Total time 10:20 a.m. through 11:24 a.m..  Greater than 50% of the time spent in discussion, counseling, and review.  Medications reviewed and addressed.  Labs reviewed and discussed with patient and ordered for follow-up.  Various different topics discussed as well as plan of care.    Review of Systems   Constitutional:  Negative for appetite change and fever.   HENT:  Negative for congestion, postnasal drip, rhinorrhea and sinus pressure.    Eyes:  Negative for discharge and itching.   Respiratory:  Negative for cough, chest tightness, shortness of breath and wheezing.    Cardiovascular:  Negative for chest pain, palpitations and leg swelling.   Gastrointestinal:  Negative for abdominal distention, abdominal pain, blood in stool, constipation, diarrhea, nausea and vomiting.    Endocrine: Negative for polydipsia, polyphagia and polyuria.   Genitourinary:  Negative for dysuria and hematuria.   Musculoskeletal:  Negative for arthralgias and myalgias.   Skin:  Negative for rash.   Allergic/Immunologic: Negative for environmental allergies and food allergies.   Neurological:  Negative for tremors, seizures and syncope.   Hematological:  Negative for adenopathy. Does not bruise/bleed easily.    Objective:        Vitals:    22 0951 22 1026   BP: 138/80 115/75  Comment: avr at home   Pulse: 77    SpO2: 95%    Weight: 86.4 kg (190 lb 9.4 oz)    Height: 5' (1.524 m)        BMI Readings from Last 3 Encounters:   22 37.22 kg/m²   22 37.89 kg/m²   02/15/22 37.89 kg/m²        Wt Readings from Last 3 Encounters:   22 0951 86.4 kg (190 lb 9.4 oz)   22 1614 88 kg (194 lb)   02/15/22 1515 88 kg (194 lb 0.1 oz)        BP Readings from Last 3 Encounters:   22 115/75   22 (!) 141/67   21 135/65        There are no preventive care reminders to display for this patient.     Health Maintenance Due   Topic Date Due    Cervical Cancer Screening  Never done    Mammogram  2022    Influenza Vaccine (1) 2022         Past Medical History:   Diagnosis Date    Anxiety     Dysphagia 2018    Thyroid disease        Past Surgical History:   Procedure Laterality Date    ABDOMINAL SURGERY       SECTION      CHOLECYSTECTOMY      COLONOSCOPY N/A 2022    Procedure: COLONOSCOPY;  Surgeon: Emil Connelly Jr., MD;  Location: [facility];  Service: Endoscopy;  Laterality: N/A;    ESOPHAGOGASTRODUODENOSCOPY N/A 2022    Procedure: EGD (ESOPHAGOGASTRODUODENOSCOPY);  Surgeon: Emil Connelly Jr., MD;  Location: [facility];  Service: Endoscopy;  Laterality: N/A;    FOOT SURGERY Left     KNEE ARTHROSCOPY Left     LUMBAR SPINE SURGERY      SHOULDER ARTHROSCOPY Right     THYROID SURGERY         Social History     Tobacco Use    Smoking status: Never     Smokeless tobacco: Never   Substance Use Topics    Alcohol use: No    Drug use: No       Family History   Problem Relation Age of Onset    Arthritis Mother     Miscarriages / Stillbirths Mother         stillborn    Breast cancer Mother     Diabetes Father         DM2    Hyperlipidemia Father     Hypertension Father     Kidney disease Father         CKD4    Vision loss Father         cataract surg    Cancer Sister         breast cancer    Breast cancer Sister     Arthritis Maternal Grandmother     Stroke Maternal Grandfather     Cancer Paternal Grandmother         lung cancer    COPD Paternal Grandmother         emphysema    Vision loss Paternal Grandmother         cataract surgery    Early death Paternal Grandfather         MI at 55-57    Heart disease Paternal Grandfather         MI at 55-57    Hyperlipidemia Paternal Grandfather     Hypertension Paternal Grandfather        Review of patient's allergies indicates:   Allergen Reactions    Lexapro [escitalopram]      Made her feel aggressive; not her self; so stopped    Zoloft [sertraline]      Made her feel aggressive. So stopped; not her self on it.     Asa [aspirin]     Nsaids (non-steroidal anti-inflammatory drug)      Celebrex, prescription strength       Current Outpatient Medications on File Prior to Visit   Medication Sig Dispense Refill    acetaminophen (TYLENOL) 500 MG tablet Take 1,000 mg by mouth 3 (three) times daily.      atorvastatin (LIPITOR) 10 MG tablet TAKE 1 TABLET (10 MG TOTAL) BY MOUTH ONCE DAILY 50 tablet 0    famotidine (PEPCID) 40 MG tablet Take 1 tablet (40 mg total) by mouth every evening. 60 tablet 5    gabapentin (NEURONTIN) 300 MG capsule Take by mouth. 1 tablet in the am and 2 tablets at night  5    ibuprofen (ADVIL,MOTRIN) 200 MG tablet Take 200 mg by mouth As instructed for Pain. Pt takes 3 tablets bid      levothyroxine (SYNTHROID, LEVOTHROID) 175 MCG tablet TAKE ONE TABLET BY MOUTH BEFORE BREAKFAST **DISCONTINUE ALL OTHER  LEVOTHYROXINE STRENGTHS** 30 tablet 0    methocarbamol (ROBAXIN) 750 MG Tab Take 1,500 mg by mouth 3 (three) times daily.   0    metoprolol succinate (TOPROL-XL) 25 MG 24 hr tablet TAKE 1 TABLET (25 MG TOTAL) BY MOUTH 2 (TWO) TIMES A DAY **INCREASED FREQUENCY** 100 tablet 0    ondansetron (ZOFRAN-ODT) 8 MG TbDL Take 8 mg by mouth 3 (three) times daily.      oxyCODONE (ROXICODONE) 10 mg Tab immediate release tablet Take 10 mg by mouth 3 (three) times daily.      topiramate (TOPAMAX) 50 MG tablet Take 50 mg by mouth every 12 (twelve) hours.       busPIRone (BUSPAR) 7.5 MG tablet Take 7.5 mg by mouth 3 (three) times daily as needed.      pantoprazole (PROTONIX) 40 MG tablet Take 1 tablet (40 mg total) by mouth once daily. 90 tablet 1    traZODone (DESYREL) 50 MG tablet Take 1 tablet (50 mg total) by mouth nightly as needed for Insomnia. 30 tablet 2     No current facility-administered medications on file prior to visit.       Physical Exam  Vitals reviewed.   Constitutional:       Appearance: She is well-developed.   HENT:      Head: Normocephalic and atraumatic.   Neck:      Thyroid: No thyromegaly.   Cardiovascular:      Rate and Rhythm: Normal rate and regular rhythm.      Heart sounds: Normal heart sounds. No murmur heard.    No gallop.   Pulmonary:      Effort: Pulmonary effort is normal. No respiratory distress.      Breath sounds: Normal breath sounds. No wheezing or rales.   Abdominal:      General: Bowel sounds are normal. There is no distension.      Palpations: Abdomen is soft.      Tenderness: There is no abdominal tenderness. There is no guarding or rebound.   Musculoskeletal:         General: Normal range of motion.      Cervical back: Normal range of motion and neck supple.      Right lower leg: No edema.      Left lower leg: No edema.      Comments: LLE: no palp calf or thigh tenderness to palp; no LE edema noted. DP2+. RLE w no noted edema or calf tenderness to palp.   Lymphadenopathy:      Cervical:  No cervical adenopathy.   Skin:     Findings: No rash.   Neurological:      Mental Status: She is alert and oriented to person, place, and time.      Comments: Moves all 4 extremities fine.   Psychiatric:         Behavior: Behavior normal.         Thought Content: Thought content normal.       Assessment:       1. Essential hypertension    2. Generalized anxiety disorder    3. Situational stress    4. Panic attacks    5. Sleep disorder    6. Mixed hyperlipidemia    7. Pain of left calf    8. Pain in left thigh    9. Postoperative hypothyroidism    10. Impaired fasting blood sugar    11. Impaired glucose metabolism    12. Class 2 severe obesity due to excess calories with serious comorbidity and body mass index (BMI) of 37.0 to 37.9 in adult    13. Encounter for laboratory test          Plan:       Essential hypertension; Maintain < 2 Gm Na a day diet, and monitor BP at home; keep a log. Keep log for office review.  Blood pressure at home in the mid 110s over the mid 70s.  Here today manually she is 115/75 with pulse 77.    Generalized anxiety disorder; Limit caffeine intake with stress reduction and regular exercise as tolerated. Has buspar to use as needed for anxiety. Has been doing well. Except for sleep problems last week, see below. On topiramate.     Situational stress; as above.     Panic attacks; have been doing well.     Sleep disorder; on topiramate; has not been using trazodone at evening for sleep; consider retrying trazodone for initial insomnia as needed p.r.n.; has been arising during night over last week; no new stress; try buspar 5-7.5 mg then to help her get back to sleep if needed. Limit caffeine. Exercise after midafternoon.    Mixed hyperlipidemia:  Low-fat high-fiber diet with restriction dairy products and alcohol as well will help.  Lipid profile:  Cholesterol 208 worsened from 169 with triglyceride 168 worsened from 01/30 9 and HDL 52 and .4 worsened from 84.2.  On atorvastatin 10 mg  daily    Pain of left calf; no clinical symptoms of DVT; suspect may be muscle spasm related but will check huyen US LLE to r/o DVT; no swelling noted; has been intermittent over last 6 weeks. Has robaxin to use for muscle spasms; hx neck w LBP.  Has advised her not to sleep with her legs crossed at night.  No claudication symptoms noted.  No edema to lower extremities noted.  -     US Lower Extremity Veins Left; Future; Expected date: 11/11/2022    Pain in left thigh: as above; keep well hydrated w min 6-8 glasses of fluid a day; use Gatorade/2 for hydration at times.  Have advised her not to sleep with her legs crossed at night.  Has Robaxin to use for muscle spasms on a p.r.n. basis  -     US Lower Extremity Veins Left; Future; Expected date: 11/11/2022    Postoperative hypothyroidism; TFT good; same  thyroxine dosing.  TSH 1.04 with free T4 0.91    Impaired fasting blood sugar; Exercise recommended with weight reduction and low carb diet; we'll follow hemoglobin A1c's with you periodically.HgA1C 5.6;     Impaired glucose metabolism: .Exercise recommended with weight reduction and low carb diet; we'll follow hemoglobin A1c's with you periodically.  Hemoglobin A1c has improved presently staying stable at 5.6    Family history of diabetes involving her father    Class 2 severe obesity due to excess calories with serious comorbidity and body mass index (BMI) of 37.0 to 37.9 in adult; Caloric restriction w regular exercise and weight reduction. Discussed new weight reducing meds injectables; will see which ones her insurance co. covers.  Willing to try for weight reduction.  Stressed the importance of continuing to follow her diet with exercise regiment as tolerated.    Encounter for laboratory test: reviewed w pt and discussed.

## 2022-11-15 ENCOUNTER — PATIENT MESSAGE (OUTPATIENT)
Dept: FAMILY MEDICINE | Facility: CLINIC | Age: 62
End: 2022-11-15
Payer: COMMERCIAL

## 2022-11-15 RX ORDER — TIRZEPATIDE 2.5 MG/.5ML
2.5 INJECTION, SOLUTION SUBCUTANEOUS
Qty: 4 PEN | Refills: 1 | Status: SHIPPED | OUTPATIENT
Start: 2022-11-15 | End: 2022-12-14 | Stop reason: SDUPTHER

## 2022-11-15 NOTE — TELEPHONE ENCOUNTER
Please call patient and tell I have sent in Mounjaro/tizepatide at 2.5 mg subQ weekly.  Please pass on the information for prior authorization to the pharmacist for him to complete and send into the insurance company.  Medical necessity includes the following:  BMI 37.22/class 2 obesity; hypertension; impaired fasting glucose along with impaired glucose metabolism.  Please ask patient schedule appointment to see me in 6 weeks along with labs

## 2022-11-16 ENCOUNTER — HOSPITAL ENCOUNTER (OUTPATIENT)
Dept: RADIOLOGY | Facility: HOSPITAL | Age: 62
Discharge: HOME | End: 2022-11-16
Attending: INTERNAL MEDICINE
Payer: COMMERCIAL

## 2022-11-16 DIAGNOSIS — M79.652 PAIN IN LEFT THIGH: ICD-10-CM

## 2022-11-16 DIAGNOSIS — M79.662 PAIN OF LEFT CALF: ICD-10-CM

## 2022-11-16 PROCEDURE — 93971 US LOWER EXTREMITY VEINS LEFT: ICD-10-PCS | Mod: 26,LT,, | Performed by: RADIOLOGY

## 2022-11-16 PROCEDURE — 93971 EXTREMITY STUDY: CPT | Mod: 26,LT,, | Performed by: RADIOLOGY

## 2022-11-16 PROCEDURE — 93971 EXTREMITY STUDY: CPT | Mod: TC,PO,LT

## 2022-11-20 ENCOUNTER — TELEPHONE (OUTPATIENT)
Dept: FAMILY MEDICINE | Facility: CLINIC | Age: 62
End: 2022-11-20
Payer: COMMERCIAL

## 2022-11-20 DIAGNOSIS — M79.652 PAIN IN LEFT THIGH: ICD-10-CM

## 2022-11-20 DIAGNOSIS — M79.662 PAIN OF LEFT CALF: Primary | ICD-10-CM

## 2022-11-21 NOTE — TELEPHONE ENCOUNTER
Spoke with pt and notified of results. Pt verbalized understanding.   States that leg is still giving her trouble. Requesting orders for arterial ultrasound. Please advise.

## 2022-11-21 NOTE — TELEPHONE ENCOUNTER
Please call patient and notify her that her left lower extremity venous ultrasound was negative for DVT.  Please keep her follow-up appointment for reassessment

## 2022-11-23 NOTE — TELEPHONE ENCOUNTER
Patient with left calf and left thigh pain last visit with left lower extremity venous ultrasound returned back negative for DVT.  Will order bilateral lower extremity arterial ultrasounds to assess for peripheral vascular disease.  Please schedule this as a stat..  Please also schedule her for follow-up appointment as soon as possible to go over the results as well as for further evaluation of her left lower extremity discomfort.  Has been prescribed Robaxin to use as a muscle relaxant.  Please also advised that she can use extra-strength Tylenol over-the-counter as well.  Please also advise her she can increase ibuprofen to 600 mg p.o. t.i.d. as needed for pain; take after food.

## 2022-11-28 ENCOUNTER — HOSPITAL ENCOUNTER (OUTPATIENT)
Dept: RADIOLOGY | Facility: HOSPITAL | Age: 62
Discharge: HOME | End: 2022-11-28
Attending: INTERNAL MEDICINE
Payer: COMMERCIAL

## 2022-11-28 DIAGNOSIS — M79.662 PAIN OF LEFT CALF: ICD-10-CM

## 2022-11-28 DIAGNOSIS — M79.652 PAIN IN LEFT THIGH: ICD-10-CM

## 2022-11-28 PROCEDURE — 93922 US ARTERIAL LOWER EXTREMITY BILAT WITH ABI (XPD): ICD-10-PCS | Mod: 26,,, | Performed by: RADIOLOGY

## 2022-11-28 PROCEDURE — 93925 LOWER EXTREMITY STUDY: CPT | Mod: TC,PO

## 2022-11-28 PROCEDURE — 93925 LOWER EXTREMITY STUDY: CPT | Mod: 26,,, | Performed by: RADIOLOGY

## 2022-11-28 PROCEDURE — 93925 US ARTERIAL LOWER EXTREMITY BILAT WITH ABI (XPD): ICD-10-PCS | Mod: 26,,, | Performed by: RADIOLOGY

## 2022-11-28 PROCEDURE — 93922 UPR/L XTREMITY ART 2 LEVELS: CPT | Mod: 26,,, | Performed by: RADIOLOGY

## 2022-11-30 DIAGNOSIS — E89.0 POSTOPERATIVE HYPOTHYROIDISM: ICD-10-CM

## 2022-11-30 DIAGNOSIS — I10 UNCONTROLLED HYPERTENSION: ICD-10-CM

## 2022-11-30 NOTE — TELEPHONE ENCOUNTER
No new care gaps identified.  [facility] Embedded Care Gaps. Reference number: [id number]. 11/30/2022   3:21:59 PM CST

## 2022-12-01 RX ORDER — LEVOTHYROXINE SODIUM 175 UG/1
TABLET ORAL
Qty: 90 TABLET | Refills: 3 | Status: SHIPPED | OUTPATIENT
Start: 2022-12-01 | End: 2023-10-11

## 2022-12-01 NOTE — TELEPHONE ENCOUNTER
Refill Routing Note   Medication(s) are not appropriate for processing by [facility] for the following reason(s):      Drug-Disease: metoprolol succinate and Fatty liver; Transaminitis    [facility] action(s):  Defer  Approve Medication-related problems identified: Drug-disease interaction     Medication Therapy Plan: Drug-Disease: metoprolol succinate and Fatty liver; Transaminitis  Medication reconciliation completed: No     Appointments  past 12m or future 3m with PCP    Date Provider   Last Visit   11/11/2022 Benji Seo MD   Next Visit   Visit date not found Benji Seo MD   ED visits in past 90 days: 0        Note composed:8:32 AM 12/01/2022

## 2022-12-03 RX ORDER — METOPROLOL SUCCINATE 25 MG/1
TABLET, EXTENDED RELEASE ORAL
Qty: 180 TABLET | Refills: 1 | Status: SHIPPED | OUTPATIENT
Start: 2022-12-03 | End: 2023-04-24

## 2022-12-12 DIAGNOSIS — F41.1 GENERALIZED ANXIETY DISORDER: ICD-10-CM

## 2022-12-12 DIAGNOSIS — R73.09 IMPAIRED GLUCOSE METABOLISM: ICD-10-CM

## 2022-12-12 DIAGNOSIS — I10 ESSENTIAL HYPERTENSION: ICD-10-CM

## 2022-12-12 DIAGNOSIS — R73.01 IMPAIRED FASTING BLOOD SUGAR: ICD-10-CM

## 2022-12-12 DIAGNOSIS — E66.01 CLASS 2 SEVERE OBESITY DUE TO EXCESS CALORIES WITH SERIOUS COMORBIDITY AND BODY MASS INDEX (BMI) OF 37.0 TO 37.9 IN ADULT: ICD-10-CM

## 2022-12-12 NOTE — TELEPHONE ENCOUNTER
No new care gaps identified.  [facility] Embedded Care Gaps. Reference number: [id number]. 12/12/2022   4:32:57 PM CST

## 2022-12-14 ENCOUNTER — TELEPHONE (OUTPATIENT)
Dept: FAMILY MEDICINE | Facility: CLINIC | Age: 62
End: 2022-12-14
Payer: COMMERCIAL

## 2022-12-14 DIAGNOSIS — F41.1 GENERALIZED ANXIETY DISORDER: ICD-10-CM

## 2022-12-14 DIAGNOSIS — R73.01 IMPAIRED FASTING BLOOD SUGAR: ICD-10-CM

## 2022-12-14 DIAGNOSIS — I10 ESSENTIAL HYPERTENSION: ICD-10-CM

## 2022-12-14 DIAGNOSIS — R73.09 IMPAIRED GLUCOSE METABOLISM: ICD-10-CM

## 2022-12-14 DIAGNOSIS — E66.01 CLASS 2 SEVERE OBESITY DUE TO EXCESS CALORIES WITH SERIOUS COMORBIDITY AND BODY MASS INDEX (BMI) OF 37.0 TO 37.9 IN ADULT: ICD-10-CM

## 2022-12-14 RX ORDER — TIRZEPATIDE 2.5 MG/.5ML
2.5 INJECTION, SOLUTION SUBCUTANEOUS
Qty: 4 PEN | Refills: 1 | Status: SHIPPED | OUTPATIENT
Start: 2022-12-14 | End: 2023-10-26

## 2022-12-14 RX ORDER — TIRZEPATIDE 2.5 MG/.5ML
INJECTION, SOLUTION SUBCUTANEOUS
Refills: 0 | OUTPATIENT
Start: 2022-12-14

## 2022-12-14 NOTE — TELEPHONE ENCOUNTER
Refill Routing Note   Medication(s) are not appropriate for processing by [facility] for the following reason(s):      - Medication is a new start (<3 months)    [facility] action(s):  Defer       Medication Therapy Plan: New start(11/15/22); Defer  Medication reconciliation completed: No     Appointments  past 12m or future 3m with PCP    Date Provider   Last Visit   11/11/2022 Benji Seo MD   Next Visit   Visit date not found Benji Seo MD   ED visits in past 90 days: 0        Note composed:11:31 PM 12/13/2022

## 2022-12-14 NOTE — TELEPHONE ENCOUNTER
----- Message from Nancy Clement sent at 12/14/2022  1:12 PM CST -----  Contact: [phone]  Type: Needs Medical Advice  Who Called:  Pt     Best Call Back Number: [phone]    Additional Information: Pt is calling to check on PA for her tirzepatide (MOUNJARO) 2.5 mg/0.5 mL PnIj. Pt stated her refill was sent to [facility]. Pt stated her injection is due on Friday.     Pt also asking if she can have a 1 year refill sent in or does she need to come in for a Fu. Pls call back and adivse      [address] - [address]  [address]  [facility] [address]  Phone: [phone] Fax: [phone]

## 2022-12-15 NOTE — TELEPHONE ENCOUNTER
Please call and tell patient I will not be able to give her a 1 year supply of Mounjaro; she will need to keep her follow-up appointment to see how she is doing on the medication and then dose can be titrated based upon how she is tolerating it and her weight results

## 2022-12-20 ENCOUNTER — TELEPHONE (OUTPATIENT)
Dept: FAMILY MEDICINE | Facility: CLINIC | Age: 62
End: 2022-12-20
Payer: COMMERCIAL

## 2022-12-20 NOTE — TELEPHONE ENCOUNTER
"----- Message from Nicolas Pavon sent at 12/20/2022  1:01 PM CST -----  Type: Needs Medical Advice  Who Called:  Patient    Pharmacy name and phone #:        [id number] - [place], [place] - [address]  [address]  [facility] [address]  Phone: [phone] Fax: [phone]      Best Call Back Number: [phone]  Additional Information: Patient states that she has been waiting for a callback regarding the PA of her refill:  tirzepatide (MOUNJARO) 2.5 mg/0.5 mL PnIj    PA Phone # [phone]-- Needs Medical Necessity and select option "Urgent"      "

## 2022-12-23 ENCOUNTER — TELEPHONE (OUTPATIENT)
Dept: FAMILY MEDICINE | Facility: CLINIC | Age: 62
End: 2022-12-23
Payer: COMMERCIAL

## 2022-12-23 NOTE — TELEPHONE ENCOUNTER
----- Message from Dorys Kim LPN sent at 12/22/2022  5:05 PM CST -----  Contact: pt at [phone]    ----- Message -----  From: Jose L Villasenor  Sent: 12/22/2022   2:49 PM CST  To: Gabbi [facility] Staff    Type: Needs Medical Advice  Who Called:  pt    Best Call Back Number: [phone]    Additional Information: pt is calling the office to follow up on PA. Please see previous notes. Please advise.

## 2022-12-26 ENCOUNTER — PATIENT MESSAGE (OUTPATIENT)
Dept: FAMILY MEDICINE | Facility: CLINIC | Age: 62
End: 2022-12-26
Payer: COMMERCIAL

## 2022-12-27 ENCOUNTER — PATIENT MESSAGE (OUTPATIENT)
Dept: FAMILY MEDICINE | Facility: CLINIC | Age: 62
End: 2022-12-27
Payer: COMMERCIAL

## 2022-12-27 NOTE — TELEPHONE ENCOUNTER
Dr Seo. Please review. Insurance does not consider wt loss rx as a medical necessary and pt states you did something last month to cover this.

## 2023-01-17 ENCOUNTER — PATIENT MESSAGE (OUTPATIENT)
Dept: ADMINISTRATIVE | Facility: HOSPITAL | Age: 63
End: 2023-01-17
Payer: COMMERCIAL

## 2023-01-25 ENCOUNTER — PATIENT MESSAGE (OUTPATIENT)
Dept: FAMILY MEDICINE | Facility: CLINIC | Age: 63
End: 2023-01-25
Payer: COMMERCIAL

## 2023-01-25 ENCOUNTER — TELEPHONE (OUTPATIENT)
Dept: FAMILY MEDICINE | Facility: CLINIC | Age: 63
End: 2023-01-25
Payer: COMMERCIAL

## 2023-01-26 NOTE — TELEPHONE ENCOUNTER
Reach patient and discussed the case with her this night.  Discussed weight loss medication with her.  We need to see what they sent in for her latest prior authorization compared to the initial authorization in November.  Of mention prior medical indications were listed on the prior e-mail of note.  Please check into her status with [facility] of her prior authorization and let me know if there is anything I need to do and what indications stay listed from medical necessity.  Please also try Ozempic to see if it is covered by her insurance if Mounjaro is not covered.  Stressed the importance of patient schedule a follow-up appointment for medication monitoring also still needed.  Please update me tomorrow

## 2023-01-26 NOTE — TELEPHONE ENCOUNTER
Tried to reach patient by phone got a recorder left a voicemail message that I had called her to see what I could do to help and wears the ball at at present.  The earlier prior authorization had a on it medical necessity: BMI 32.22, class 2 obesity, hypertension, impaired fasting blood sugar, impaired glucose metabolism.  Just to let you know but if insurance is no longer covering it  lb on of his much more are going to be able to do as her indication is for weight reduction but maybe if the pre diabetes conditions are listed that would help as well as her obesity and hypertension.  Please call me tomorrow this anything else I can do

## 2023-02-01 ENCOUNTER — TELEPHONE (OUTPATIENT)
Dept: FAMILY MEDICINE | Facility: CLINIC | Age: 63
End: 2023-02-01

## 2023-02-01 NOTE — TELEPHONE ENCOUNTER
----- Message from Sola Cardenas sent at 2/1/2023  3:38 PM CST -----  Contact: [facility]  Type:  Pharmacy Calling to Clarify an RX    Name of Caller:Yasmine  Pharmacy Name:[facility]     Prescription Name:tirzepatide (MOUNJARO) 2.5 mg/0.5 mL PnIj  What do they need to clarify?:  Best Call Back Number:[phone] Fax: [phone]  Additional Information: Need to confirm qty of Rx

## 2023-02-01 NOTE — TELEPHONE ENCOUNTER
Spoke w/ Ashley. This has been denied. They only approve this if pt has dx of DM AND has tried/failed at least 90d regimen of metformin.

## 2023-04-03 DIAGNOSIS — F41.1 GENERALIZED ANXIETY DISORDER: ICD-10-CM

## 2023-04-03 DIAGNOSIS — F41.0 PANIC ATTACKS: ICD-10-CM

## 2023-04-03 DIAGNOSIS — I10 UNCONTROLLED HYPERTENSION: ICD-10-CM

## 2023-04-03 NOTE — TELEPHONE ENCOUNTER
No new care gaps identified.  [facility] Embedded Care Gaps. Reference number: [id number]. 4/03/2023   1:41:45 PM CDT

## 2023-04-04 RX ORDER — AMLODIPINE BESYLATE 5 MG/1
5 TABLET ORAL DAILY
Qty: 90 TABLET | Refills: 2 | Status: SHIPPED | OUTPATIENT
Start: 2023-04-04 | End: 2024-01-08

## 2023-04-04 NOTE — TELEPHONE ENCOUNTER
Refill Decision Note   Kimberly Jolley  is requesting a refill authorization.  Brief Assessment and Rationale for Refill:  Approve     Medication Therapy Plan:       Medication Reconciliation Completed: No   Comments:     No Care Gaps recommended.     Note composed:10:06 AM 04/04/2023

## 2023-04-11 ENCOUNTER — PATIENT MESSAGE (OUTPATIENT)
Dept: ADMINISTRATIVE | Facility: HOSPITAL | Age: 63
End: 2023-04-11

## 2023-04-24 DIAGNOSIS — I10 UNCONTROLLED HYPERTENSION: ICD-10-CM

## 2023-04-24 RX ORDER — METOPROLOL SUCCINATE 25 MG/1
TABLET, EXTENDED RELEASE ORAL
Qty: 180 TABLET | Refills: 1 | Status: SHIPPED | OUTPATIENT
Start: 2023-04-24 | End: 2023-11-27 | Stop reason: SDUPTHER

## 2023-04-24 NOTE — TELEPHONE ENCOUNTER
No new care gaps identified.  [facility] Embedded Care Gaps. Reference number: [id number]. 4/24/2023   10:35:57 AM LUIZT

## 2023-04-25 NOTE — TELEPHONE ENCOUNTER
Refill Routing Note   Medication(s) are not appropriate for processing by [facility] for the following reason(s):       Drug-disease interaction    [facility] action(s):  Defer      Medication Therapy Plan: metoprolol succinate and Fatty liver; Transaminitis    Appointments  past 12m or future 3m with PCP    Date Provider   Last Visit   11/11/2022 Benji Seo MD   Next Visit   Visit date not found Benji Seo MD   ED visits in past 90 days: 0        Note composed:9:10 PM 04/24/2023

## 2023-04-25 NOTE — TELEPHONE ENCOUNTER
Refill Decision Note   Kimberly Jolley  is requesting a refill authorization.  Brief Assessment and Rationale for Refill:  Approve     Medication Therapy Plan:       Medication Reconciliation Completed: No   Comments:     No Care Gaps recommended.     Note composed:10:08 PM 04/24/2023

## 2023-04-26 ENCOUNTER — PATIENT OUTREACH (OUTPATIENT)
Dept: ADMINISTRATIVE | Facility: HOSPITAL | Age: 63
End: 2023-04-26

## 2023-04-26 ENCOUNTER — PATIENT MESSAGE (OUTPATIENT)
Dept: ADMINISTRATIVE | Facility: HOSPITAL | Age: 63
End: 2023-04-26

## 2023-04-26 NOTE — PROGRESS NOTES
Non-compliant report chart audits for BREAST CANCER SCREENING.     Care Everywhere and media, updates requested and reviewed.      DIS reviewed for test needed.  Mammogram      Outreach to patient in reference to breast cancer screening.  RE:  Patient needs to schedule mammogram.

## 2023-04-26 NOTE — LETTER
May 4, 2023    Kimberly Jolley  [address] [place] [address]             [facility]  [address]  [facility] [place] [address]  Phone: [phone] Dear Karen, [facility] is committed to your overall health and would like to ensure that you are up to date on your recommended test and/or procedures.   Benji Seo MD has found that your chart shows you may be due for the following:            Health Maintenance Due   Topic    Cervical Cancer Screening     COVID-19 Vaccine (1)    Pneumococcal Vaccines (Age 0-64) (1 - PCV)    Shingles Vaccine (1 of 2)    Mammogram     Influenza Vaccine (1)            If you have had any of the above done at another facility, please let us know so that we may obtain copies from that facility.  If you have a copy of these records, please provide a copy so that we may update your records.  Also, You are welcome to request that the report be faxed to us at  ([phone]).       If you have an upcoming scheduled appointment for the above test and/or procedures, please disregard this letter.  Otherwise, please contact us through your [facility] portal or by calling [phone] and we will assist in scheduling these appointments for you.        Thank you for letting us care for you,     Sincerely,     Benji Seo MD and your [facility]

## 2023-04-26 NOTE — PROGRESS NOTES
Non-compliant report chart audits for CERVICAL CANCER SCREENING. Chart review completed for HM test overdue (mammograms, Colonoscopies, pap smears, DM labs, and/or EYE EXAMs)      Care Everywhere and media, updates requested and reviewed.      [facility] and [facility] reviewed.      Outreach to patient in reference to cervical cancer screening.      Outreach:  Cervical cancer screening

## 2023-09-22 PROBLEM — M25.519 SHOULDER PAIN: Status: ACTIVE | Noted: 2023-09-22

## 2023-09-22 PROBLEM — G89.4 CHRONIC PAIN SYNDROME: Status: ACTIVE | Noted: 2023-09-22

## 2023-09-22 PROBLEM — M54.14 THORACIC RADICULITIS: Status: ACTIVE | Noted: 2021-03-10

## 2023-09-22 PROBLEM — M53.3 PAIN IN THE COCCYX: Status: ACTIVE | Noted: 2020-12-21

## 2023-09-22 PROBLEM — M54.16 LUMBAR RADICULOPATHY: Status: ACTIVE | Noted: 2023-09-22

## 2023-09-22 PROBLEM — G95.9 CERVICAL MYELOPATHY: Status: ACTIVE | Noted: 2023-09-22

## 2023-09-22 PROBLEM — E66.01 CLASS 2 SEVERE OBESITY DUE TO EXCESS CALORIES WITH SERIOUS COMORBIDITY AND BODY MASS INDEX (BMI) OF 37.0 TO 37.9 IN ADULT: Status: RESOLVED | Noted: 2020-09-30 | Resolved: 2023-09-22

## 2023-09-22 PROBLEM — M47.814 THORACIC SPONDYLOSIS WITHOUT MYELOPATHY: Status: ACTIVE | Noted: 2021-03-10

## 2023-09-22 PROBLEM — M48.02 SPINAL STENOSIS IN CERVICAL REGION: Status: ACTIVE | Noted: 2023-09-22

## 2023-09-22 PROBLEM — G97.1 POST-DURAL PUNCTURE HEADACHE: Status: ACTIVE | Noted: 2020-10-23

## 2023-09-22 PROBLEM — M79.18 MYOFASCIAL PAIN: Status: ACTIVE | Noted: 2023-09-22

## 2023-09-22 PROBLEM — M75.00 ADHESIVE CAPSULITIS OF SHOULDER: Status: ACTIVE | Noted: 2023-09-22

## 2023-09-22 PROBLEM — R42 VERTIGO: Status: ACTIVE | Noted: 2023-06-05

## 2023-09-22 PROBLEM — M46.1 SACROILIITIS, NOT ELSEWHERE CLASSIFIED: Status: ACTIVE | Noted: 2023-09-22

## 2023-09-22 PROBLEM — M53.3 PAIN OF LEFT SACROILIAC JOINT: Status: ACTIVE | Noted: 2021-03-10

## 2023-09-22 PROBLEM — M54.17 LUMBOSACRAL RADICULITIS: Status: ACTIVE | Noted: 2020-09-10

## 2023-09-26 PROBLEM — Z20.3 RABIES CONTACT: Status: ACTIVE | Noted: 2023-09-26
